# Patient Record
Sex: MALE | Race: WHITE | NOT HISPANIC OR LATINO | Employment: FULL TIME | URBAN - NONMETROPOLITAN AREA
[De-identification: names, ages, dates, MRNs, and addresses within clinical notes are randomized per-mention and may not be internally consistent; named-entity substitution may affect disease eponyms.]

---

## 2022-06-17 ENCOUNTER — TELEPHONE (OUTPATIENT)
Dept: OTOLARYNGOLOGY | Facility: CLINIC | Age: 42
End: 2022-06-17

## 2022-07-09 ENCOUNTER — TELEPHONE ENCOUNTER (OUTPATIENT)
Dept: URBAN - METROPOLITAN AREA CLINIC 121 | Facility: CLINIC | Age: 42
End: 2022-07-09

## 2022-07-10 ENCOUNTER — TELEPHONE ENCOUNTER (OUTPATIENT)
Dept: URBAN - METROPOLITAN AREA CLINIC 121 | Facility: CLINIC | Age: 42
End: 2022-07-10

## 2022-07-10 RX ORDER — CIPROFLOXACIN HCL 500 MG
TABLET ORAL TWICE A DAY
Refills: 0 | Status: ACTIVE | COMMUNITY
Start: 2006-05-24

## 2022-07-26 NOTE — PROGRESS NOTES
Donald Kee Jr, MD  [facility]NT [facility] EAR NOSE & THROAT  [address], [address]  [place] [address]  Fax [phone]  Phone [phone]      Visit Type: NEW PATIENT   Chief Complaint   Patient presents with   • Thyroid Problem     Thyroid nodules   • Adenopathy   • ear pressure, left ear        HPI   Accmpanied by: No one  He complains of Nodes and thyroid.   He had Covid in May. He then developed secondary infection. He underwent U/S and found to have thyroid nodules and some adenopathy.  Has similar infection 5 yrs ago.  Has had Rocephin injection and steroids.  Denies reflux symptoms.  He has sleep apnea and uses CPAP.  Smoke- none  Drink- none  Fever- at best low grade. Was seen early with symptoms.  He had dificulty swallowing. Some breathing difficutly. He has completely cleared.  He wears CPAP  Had thyroiditis 5 yrs ago, but normal TSH now      Past Medical History:   Diagnosis Date   • Bell's palsy    • Hypertension    • Sleep apnea        History reviewed. No pertinent surgical history.    Family History: His family history includes Diabetes type II in his father and maternal grandmother; Heart disease in his father.     Social History: He  reports that he has never smoked. He has never used smokeless tobacco. He reports that he does not drink alcohol and does not use drugs.    Home Medications:  Vitamin D3, fluticasone, hydroCHLOROthiazide, lisinopril, and oxymetazoline    Allergies:  He is allergic to keflex [cephalexin] and augmentin [amoxicillin-pot clavulanate].       Vital Signs:      ENT Physical Exam  Constitutional  Appearance: patient appears well-developed and well-nourished,  Communication/Voice: communication appropriate for developmental age; vocal quality normal;  Constitutional comments: obese  Head and Face  Appearance: head appears normal, face appears normal and face appears atraumatic;  Palpation: facial palpation  normal;  Salivary: glands normal;  Ear  Hearing: intact;  Auricles: bilateral auricles normal;  External Mastoids: right external mastoid normal; left external mastoid normal;  Ear Canals: bilateral ear canals normal;  Tympanic Membranes: bilateral tympanic membranes normal;  Nose  External Nose: nares patent bilaterally; external nose normal; nasal deformity not visible;  Internal Nose: bilateral intranasal mucosa edematous and erythematous; nasal septal deviation present; deviation is to the left, septal deviation is mild; bilateral inferior turbinates edematous and erythematous;  Oral Cavity/Oropharynx  Lips: normal;  Teeth: normal;  Gums: gingiva normal;  Tongue: tongue macroglossia present; Reese II position; Oral Tongue comments: Mod  Oral mucosa: normal;  Hard palate: normal;  Soft palate: normal;  Tonsils: normal;  Base of Tongue: normal;  Posterior pharyngeal wall: normal;  Neck  Neck: neck normal;  Respiratory  Inspection: breathing unlabored; normal breathing rate;  Cardiovascular  Inspection: extremities are warm and well perfused; no peripheral edema present;  Neurovestibular  Mental Status: alert and oriented;  Psychiatric: mood normal; affect is appropriate;     Flexible laryngoscopy    Date/Time: 7/28/2022 11:26 AM  Performed by: Donald Kee Jr., MD  Authorized by: Donald Kee Jr., MD     Consent:     Consent obtained:  Verbal    Consent given by:  Patient    Alternatives discussed:  No treatment  Anesthesia (see MAR for exact dosages):     Anesthesia method:  Topical application    Topical anesthetic:  Tetracaine  Procedure details:     Indications: hoarseness, dysphagia, or aspiration      Medication:  Afrin    Instrument: flexible fiberoptic laryngoscope      Scope location: left nare    Sinus/ Nasopharynx:     Right nasopharynx: patent and inflammation      Left nasopharynx: patent and inflammation      Right eustachian tube: patent      Left eustachian tube: inflammation,  patent and inflammation    Oropharynx/ Supraglottis:     Posterior pharyngeal wall: inflamed      Oropharynx: inflammation      Vallecula: inflammation      Base of tongue: lingual tonsillar hypertrophy and inflammation      Epiglottis: inflammation    Larynx/ Hypopharynx:     Arytenoids: inflammation and interarytenoid edema      Hypopharynx: inflammation      Pyriform sinus: inflammation      False vocal cords: inflammation      True vocal cords: normal    Post-procedure details:     Patient tolerance of procedure:  Tolerated well       Result Review    RESULTS REVIEW    I have reviewed the patients old records in the chart.   I have reviewed the patients old records in the chart.  The following results/records were reviewed:  I reviewed the patient's new imaging results and agree with the interpretation.   Referral to Otolaryngology for lymphadenopathy (06/20/2022)  REFERRAL/PRE-AUTH MRN - SCAN - Referral (06/15/2022)  SCANNED - IMAGING (07/28/2022 00:00)    Assessment & Plan    Diagnoses and all orders for this visit:    1. Pharyngitis, unspecified etiology (Primary)  Comments:  Active, probably related to reflux, requires further work-up  Orders:  -     Flexible laryngoscopy    2. ETD (Eustachian tube dysfunction), left  Comments:  Related to reflux and upper airway edema  Orders:  -     Flexible laryngoscopy    3. ANGIE on CPAP  Comments:  Compliant, pressures possibly causing irritation of the upper aerodigestive tract  Orders:  -     Flexible laryngoscopy    4. Obesities, morbid (HCC)    5. Nasal turbinate hypertrophy  Comments:  Bilateral ITs    6. Cervical adenopathy  Comments:  resolved  Orders:  -     Flexible laryngoscopy    7. Anomaly of epiglottis  Comments:  Check magda long L  Orders:  -     Flexible laryngoscopy    8. Multiple thyroid nodules  Comments:  Small, requires no immediate work-up    Other orders  -     fluticasone (FLONASE) 50 MCG/ACT nasal spray; 2 sprays into the nostril(s) as directed by  provider 2 (Two) Times a Day.  Dispense: 48 g; Refill: 3  -     oxymetazoline (AFRIN) 0.05 % nasal spray; 2 sprays into the nostril(s) as directed by provider 3 (Three) Times a Day for 3 days. Use for 3 days then stop  Dispense: 2 mL; Refill: 0       Conservative management.     Patient has multiple ENT issues.  I feel his pharyngitis is caused from CPAP and reflux.  He is also had recent upper respiratory tract infection.  This may have left him with residual edema, exacerbated by CPAP especially along the eustachian tubes.  I do not feel the patient has any adenopathy at this point time.  He probably had acute reactive adenitis that has resolved.  He does have thyroid nodules that do not require immediate work-up at this point in time.  I see no evidence of thyroiditis.  I do not seen thyroiditis. I will treat ETD with nasal steroids. He will continue CPAP.  Afrin x 3 days  Flonase BID  Wear CPAP  Nasal saline  See PCP for yearly thyroid evaluation  Call for problems      My Chart:  Patient is using My Chart    Patient understand(s) and agree(s) with the treatment plan as described.    Return in about 6 weeks (around 9/8/2022) for Recheck Nose, ETD.      Donald Kee Jr, MD  07/28/22  11:24 CDT

## 2022-07-28 ENCOUNTER — OFFICE VISIT (OUTPATIENT)
Dept: OTOLARYNGOLOGY | Facility: CLINIC | Age: 42
End: 2022-07-28

## 2022-07-28 VITALS — TEMPERATURE: 98.2 F | WEIGHT: 306 LBS

## 2022-07-28 DIAGNOSIS — G47.33 OSA ON CPAP: ICD-10-CM

## 2022-07-28 DIAGNOSIS — Q31.8 ANOMALY OF EPIGLOTTIS: ICD-10-CM

## 2022-07-28 DIAGNOSIS — J02.9 PHARYNGITIS, UNSPECIFIED ETIOLOGY: Primary | ICD-10-CM

## 2022-07-28 DIAGNOSIS — H69.82 ETD (EUSTACHIAN TUBE DYSFUNCTION), LEFT: ICD-10-CM

## 2022-07-28 DIAGNOSIS — R59.0 CERVICAL ADENOPATHY: ICD-10-CM

## 2022-07-28 DIAGNOSIS — E66.01 OBESITIES, MORBID: ICD-10-CM

## 2022-07-28 DIAGNOSIS — Z99.89 OSA ON CPAP: ICD-10-CM

## 2022-07-28 DIAGNOSIS — E04.2 MULTIPLE THYROID NODULES: ICD-10-CM

## 2022-07-28 DIAGNOSIS — J34.3 NASAL TURBINATE HYPERTROPHY: ICD-10-CM

## 2022-07-28 PROCEDURE — 99204 OFFICE O/P NEW MOD 45 MIN: CPT | Performed by: OTOLARYNGOLOGY

## 2022-07-28 PROCEDURE — 31575 DIAGNOSTIC LARYNGOSCOPY: CPT | Performed by: OTOLARYNGOLOGY

## 2022-07-28 RX ORDER — HYDROCHLOROTHIAZIDE 12.5 MG/1
12.5 TABLET ORAL EVERY MORNING
COMMUNITY
Start: 2022-07-06

## 2022-07-28 RX ORDER — LISINOPRIL 40 MG/1
TABLET ORAL
COMMUNITY
Start: 2020-03-26

## 2022-07-28 RX ORDER — FLUTICASONE PROPIONATE 50 MCG
2 SPRAY, SUSPENSION (ML) NASAL 2 TIMES DAILY
Qty: 48 G | Refills: 3 | Status: SHIPPED | OUTPATIENT
Start: 2022-07-28

## 2022-07-28 RX ORDER — CHOLECALCIFEROL (VITAMIN D3) 1250 MCG
CAPSULE ORAL
COMMUNITY
Start: 2020-03-26

## 2022-07-28 RX ORDER — OXYMETAZOLINE HYDROCHLORIDE 0.05 G/100ML
2 SPRAY NASAL 3 TIMES DAILY
Qty: 2 ML | Refills: 0 | Status: SHIPPED | OUTPATIENT
Start: 2022-07-28 | End: 2022-07-31

## 2022-07-28 NOTE — PATIENT INSTRUCTIONS
Afrin use:  Use 2 puffs each nostril 3 times daily for 3 days only!!  Stop using after 3 days unless instructed to use longer    Nasal steroid use:  Using nasal steroids:  You will be prescribed one of the following nasal steroids: Flonase, Nasacort, Nasonex, Rhinocort, Qnasl, Zetonna  2 puffs each nostril 2 times daily  Start as soon as possible    Use Afrin first and wait 10 minutes to allow the nose to open. Then administer nasal steroids.       NASAL SALINE:  Use 2 puffs each nostril 4-6 times daily and more frequently if possible.  You can buy saline spray or you can make your own and use an old spray bottle to administer  Use a humidifier at bedside  Recipe for saline:  Water                                 1 quart  Salt (table)                        1 tablespoon  Gylcerin (or Maddy Syrup)    1 teaspoon  Sodium bicarbonate           1 teaspoon  Sprays or Seema pots are recommended    Do not allow to stand for more than 24 hrs. Make new solution. There is no preservative in this solution.     How to pop ears:  Pop your ears by holding nose and closing mouth, then blow hard until ears pop  Children (less than 8-9 years)- blow up ballons 4 times a day and more frequently     Wear CPAP    Diet  Exercise        CONTACT INFORMATION:  The main office phone number is [phone]. For emergencies after hours and on weekends, this number will convert over to our answering service and the on call provider will answer. Please try to keep non emergent phone calls/ questions to office hours 9am-5pm Monday through Friday.     [employer]  As an alternative, you can sign up and use the Epic MyChart system for more direct and quicker access for non emergent questions/ problems.  [employer] allows you to send messages to your doctor, view your test results, renew your prescriptions, schedule appointments, and more. To sign up, go to [employer] and click on the Sign Up Now link in the New User? box.  Enter your [employer] Activation Code exactly as it appears below along with the last four digits of your Social Security Number and your Date of Birth () to complete the sign-up process. If you do not sign up before the expiration date, you must request a new code.    [employer] Activation Code: Activation code not generated  Current [employer] Status: Active    If you have questions, you can email [email or web address] or call [phone] to talk to our [employer] staff. Remember, [employer] is NOT to be used for urgent needs. For medical emergencies, dial 911.

## 2022-09-14 ENCOUNTER — OFFICE VISIT (OUTPATIENT)
Dept: OTOLARYNGOLOGY | Facility: CLINIC | Age: 42
End: 2022-09-14

## 2022-09-14 VITALS
TEMPERATURE: 98.4 F | HEIGHT: 70 IN | SYSTOLIC BLOOD PRESSURE: 141 MMHG | DIASTOLIC BLOOD PRESSURE: 80 MMHG | BODY MASS INDEX: 42.95 KG/M2 | WEIGHT: 300 LBS | HEART RATE: 111 BPM

## 2022-09-14 DIAGNOSIS — H69.82 ETD (EUSTACHIAN TUBE DYSFUNCTION), LEFT: ICD-10-CM

## 2022-09-14 DIAGNOSIS — K21.9 GASTROESOPHAGEAL REFLUX DISEASE WITHOUT ESOPHAGITIS: ICD-10-CM

## 2022-09-14 DIAGNOSIS — J34.3 NASAL TURBINATE HYPERTROPHY: ICD-10-CM

## 2022-09-14 DIAGNOSIS — R59.0 CERVICAL ADENOPATHY: ICD-10-CM

## 2022-09-14 DIAGNOSIS — E04.2 MULTIPLE THYROID NODULES: ICD-10-CM

## 2022-09-14 DIAGNOSIS — Q31.8 ANOMALY OF EPIGLOTTIS: ICD-10-CM

## 2022-09-14 DIAGNOSIS — Z99.89 OSA ON CPAP: ICD-10-CM

## 2022-09-14 DIAGNOSIS — E66.01 OBESITIES, MORBID: ICD-10-CM

## 2022-09-14 DIAGNOSIS — G47.33 OSA ON CPAP: ICD-10-CM

## 2022-09-14 DIAGNOSIS — J02.9 PHARYNGITIS, UNSPECIFIED ETIOLOGY: Primary | ICD-10-CM

## 2022-09-14 DIAGNOSIS — K21.9 LARYNGOPHARYNGEAL REFLUX (LPR): ICD-10-CM

## 2022-09-14 PROCEDURE — 99213 OFFICE O/P EST LOW 20 MIN: CPT | Performed by: OTOLARYNGOLOGY

## 2022-09-14 PROCEDURE — 31575 DIAGNOSTIC LARYNGOSCOPY: CPT | Performed by: OTOLARYNGOLOGY

## 2022-09-14 NOTE — PROGRESS NOTES
Donald Kee Jr, MD  [facility] EAR NOSE & THROAT  [address], [address]  [place] [address]  Fax [phone]  Phone [phone]      Visit Type: FOLLOW UP   Chief Complaint   Patient presents with   • Follow-up     Pharynitis        HPI   Accompanied by: No one  He presents for a follow up evaluation. He has stopped nasal steroids.  He feels better.  Using CPAP.  Diet- not really  Exercising- none  Throat is better.  Sometimes has L ear pressure. Will pop and resolves.     Past Medical History:   Diagnosis Date   • Bell's palsy    • Hypertension    • Sleep apnea        History reviewed. No pertinent surgical history.    Family History: His family history includes Diabetes type II in his father and maternal grandmother; Heart disease in his father.     Social History: He  reports that he has never smoked. He has never used smokeless tobacco. He reports that he does not drink alcohol and does not use drugs.    Home Medications:  Vitamin D3, fluticasone, hydroCHLOROthiazide, and lisinopril    Allergies:  He is allergic to keflex [cephalexin] and augmentin [amoxicillin-pot clavulanate].       Vital Signs:   Temp:  [98.4 °F (36.9 °C)] 98.4 °F (36.9 °C)  Heart Rate:  [111] 111  BP: (141)/(80) 141/80  ENT Physical Exam  Constitutional  Appearance: patient appears well-developed and well-nourished,  Communication/Voice: communication appropriate for developmental age; vocal quality normal;  Constitutional comments: obese  Head and Face  Appearance: head appears normal, face appears normal and face appears atraumatic;  Palpation: facial palpation normal;  Salivary: glands normal;  Ear  Hearing: intact;  Auricles: bilateral auricles normal;  External Mastoids: right external mastoid normal; left external mastoid normal;  Ear Canals: bilateral ear canals normal;  Tympanic Membranes: bilateral tympanic membranes normal;  Nose  External Nose: nares patent  bilaterally; external nose normal; nasal deformity not visible;  Internal Nose: bilateral intranasal mucosa edematous and erythematous; nasal septal deviation present; deviation is to the left, septal deviation is mild; Septum comments: Left to right low and mid moderate to severe   bilateral inferior turbinates edematous and erythematous;  Oral Cavity/Oropharynx  Lips: normal;  Teeth: normal;  Gums: gingiva normal;  Tongue: tongue macroglossia present; Reese II position; Oral Tongue comments: Mod  Oral mucosa: normal;  Hard palate: normal;  Soft palate: normal;  Tonsils: normal;  Base of Tongue: normal;  Posterior pharyngeal wall: normal;  Neck  Neck: neck normal;  Respiratory  Inspection: breathing unlabored; normal breathing rate;  Cardiovascular  Inspection: extremities are warm and well perfused; no peripheral edema present;  Neurovestibular  Mental Status: alert and oriented;  Psychiatric: mood normal; affect is appropriate;     Flexible laryngoscopy    Date/Time: 9/14/2022 3:59 PM  Performed by: Donald Kee Jr., MD  Authorized by: Donald Kee Jr., MD     Consent:     Consent obtained:  Verbal    Consent given by:  Patient    Alternatives discussed:  No treatment  Anesthesia (see MAR for exact dosages):     Anesthesia method:  Topical application  Procedure details:     Indications: airway obstruction and hoarseness, dysphagia, or aspiration      Medication:  Afrin    Instrument: flexible fiberoptic laryngoscope      Scope location: right nare    Nasal cavity:     Nasal vestibule: left nasal cavity occluded    Sinus/ Nasopharynx:     Right nasopharynx: patent and inflammation      Left nasopharynx: patent and inflammation      Right eustachian tube: patent      Left eustachian tube: inflammation, patent and inflammation    Oropharynx/ Supraglottis:     Posterior pharyngeal wall: inflamed      Oropharynx: inflammation      Base of tongue: lingual tonsillar hypertrophy and inflammation       Epiglottis: retroflexed (Partially overshadowing the supraglottis)    Larynx/ Hypopharynx:     Arytenoids: inflammation      Hypopharynx: inflammation      Pyriform sinus: inflammation      False vocal cords: inflammation      True vocal cords: normal    Post-procedure details:     Patient tolerance of procedure:  Tolerated well  Comments:      Overall appearance the mucosal surface is that of inflammation from reflux  Epiglottis is retroflexed significantly enough to partially obstruct the supraglottic aperture       Result Review    RESULTS REVIEW    I have reviewed the patients old records in the chart.   I have reviewed the patients old records in the chart.     Assessment & Plan    Diagnoses and all orders for this visit:    1. Pharyngitis, unspecified etiology (Primary)  Comments:  Markedly improved  Orders:  -     Flexible laryngoscopy    2. ETD (Eustachian tube dysfunction), left  Comments:  Improved    3. ANGIE on CPAP  Comments:  Controlled  Orders:  -     Flexible laryngoscopy    4. Obesities, morbid (HCC)  Comments:  Encourage lifestyle changes    5. Nasal turbinate hypertrophy  Comments:  Improved    6. Cervical adenopathy    7. Anomaly of epiglottis  Comments:  Retroflexed contributing to hypopharyngeal obstruction  Orders:  -     Flexible laryngoscopy    8. Laryngopharyngeal reflux (LPR)  Comments:  Improved control  Orders:  -     Flexible laryngoscopy    9. Gastroesophageal reflux disease without esophagitis  Comments:  Improved    10. Multiple thyroid nodules  Comments:  Requires further follow-up, recommend ultrasound in 1 year  Orders:  -     US Head Neck Soft Tissue; Future       Continue current management plan.     Patient appears to have controlled most of his throat symptoms.  I still feel he has significant sleep apnea induced gastroesophageal reflux.  He has the appearance of reflux in his throat.  I have advised the patient that change of lifestyle, diet, exercise, reflux control are  paramount importance.  He will continue control of sleep apnea.  I have discussed sleep apnea surgery with him as well.  Patient has history of thyroid nodules.  I feel he needs thyroid ultrasound in approximately 1 year.  I will follow him up in 1 year with a thyroid ultrasound.  I can continue discussion of above problems with him at that point time  Reflux precautions  Antacids  Nasal saline  Call fro nose and throat problems      My Chart:  Patient is using My Chart    Patient understand(s) and agree(s) with the treatment plan as described.    Return in about 1 year (around 9/14/2023) for Recheck Nose, ETD and throat. Thyroid U/S.      Donald Kee Jr, MD  09/14/22  16:02 CDT

## 2022-09-14 NOTE — PATIENT INSTRUCTIONS
"NASAL SALINE:  Use 2 puffs each nostril 4-6 times daily and more frequently if possible.  You can buy saline spray or you can make your own and use an old spray bottle to administer  Use a humidifier at bedside  Recipe for saline:  Water                                 1 quart  Salt (table)                        1 tablespoon  Gylcerin (or Maddy Syrup)    1 teaspoon  Sodium bicarbonate           1 teaspoon  Sprays or Seema pots are recommended    Do not allow to stand for more than 24 hrs. Make new solution. There is no preservative in this solution.     THE PROBLEM OF ACID REFLUX    In BOTH children and adults, irritating acids may leak from the stomach and come up into the esophagus and throat. This can occur at any time, but occurs more commonly when lying down. When the acid touches the lining of the esophagus, there is irritation and muscle spasm, which can be felt up into the throat. Usually this is felt as \"heartburn\". When scarring of the esophagus occurs, the esophagus becomes less sensitive and the symptoms may only be in the throat.     Some of the symptoms that can occur with acid reflux into the throat include coughing, soreness, burning, hoarseness, throat clearing, excessive mucous, bad taste in the mouth, bad breath, a sensation of a lump in the throat, wheezing, post-nasal drip, choking spells, bleeding and nausea. In a small percentage of people, more serious problems may result, such as pneumonia, ulcers in the larynx (voice box), reduced mobility of the vocal cords and a pouch in the upper esophagus (diverticulum). In children, the symptoms may be different and include persistent vomiting, bleeding from the esophagus, respiratory problems, pneumonia, asthma, choking spells, swallowing problems and anemia. In some cases, unexplained fussiness and crying is due to reflux.     The following instructions are designed to help neutralize the stomach acid, reduce the production of the acid, promote " emptying of the acid from the stomach into the intestines and prevent acid from coming up into the esophagus. You should adopt enough of these changes to alleviate your symptoms. If carrying out these suggestions produces no change, it is imperative that you return so that we can discuss further the problem. More testing may be required, as might medication. It is important to realize that the esophagus takes time to heal, so you should not expect results immediately.    Diet  Most often, the throat symptoms above are due to dietary abuses. Certain foods are known to cause irritation, because they are acids themselves or cause excess production of stomach acid. These should be avoided, if possible. The following is a partial list of foods recognized as troublesome: coffee (even decaffeinated), tea chocolate, cola beverages, citrus beverages (such as 7-Up), caffeine containing beverages, alcohol, citrus fruits and juices, highly spiced foods, fatty foods, candies, nuts, mints, milk and milk products. Some of the worst offenders for promoting stomach acid are milk and beer.     Hard candies, gum, breath fresheners, throat lozenges, cough drops, mouthwashes, gargles, may actually irritate the throat directly (many cough drops and lozenges contain irritants such as oil of eucalyptus and menthol), and can also stimulate acid production directly.     Large amounts of liquid in the diet tend to promote reflux, because liquids tend to come back up more easily than solids.     Meals should be bland and consist of real food, trying to avoid recreational food. Multiple small meals, rather than one or two large meals helps prevent reflux by not stretching the stomach and increasing the time needed for digestion, as well increasing the amount of acid needed to digest the meal. If you are overweight, losing weight is tremendously helpful.     YOU SHOULD NOT EAT FOR AT LEAST TWO HOURS BEFORE RETIRING AND YOU SHOULD REMAIN SITTING  OR STANDING FOR AT LEAST 45 MINUTES AFTER EACH MEAL. This promotes passage of food from the stomach into the intestine.    Posture  Body weight is a significant factor in promoting reflux. Losing weight is beneficial. Pregnancy will markedly increase the symptoms of heartburn and throat pain. You should avoid clothing that fits tightly across the mid-section, as this promotes squeezing of the abdominal contents upward. It is helpful to practice abdominal or diaphragmatic breathing, using the stomach to push out each breath rather than chest expansion. Avoid slumping while sitting, and avoid stooping or straining.     For many, the reflux occurs at night while sleeping. This is the time acid production is the greatest and you are lying down, a position that promotes reflux, thus setting up the irritation that occurs the next morning. One of the most important things you can do is to elevate the head of the bed, in an effort to use gravity to keep the acid in the stomach. This is accomplished by placed blocks or bricks beneath the legs at the head of the bed, raising the head 6-10 inches. Do not make the head so high that you slide down. Pillows are not effective because they cause the body to curl, increasing abdominal pressure and it is difficult to remain upright on them. Additionally, pillows promote sleeping on the back, but it is far more desirable to sleep on the right side or on the stomach, as this allows gas to escape, while preventing the escape of acid material. Children and infants, who are refluxing, should sleep on their stomachs.  Exercise  Regular exercise is extremely beneficial in promoting good digestion and regularity. The abdominal muscles are toned, which aids in controlling acid problems. However, it is recommended that you not participate in vigorous activity immediately after eating. This causes pressure on an already full stomach, forcing acid up into the esophagus. Regular exercise is  encouraged, prior to meals, or two hours after meals.  Antacids  Antacids should be used regularly, as they neutralize excess acid. Gelusil II, Mylanta II, Tums and Rolaids are popular brands. Gaviscon is not an antacid, but floats on top of the stomach acid, preventing esophageal irritation. Care should be used in administering large doses of antacids, especially in children. Many antacid preparations contain magnesium, which promotes frequent bowel movements, while antacids that contain aluminum can be constipating. Tums have a high calcium content that can be used to some advantage in older women with reflux.     Antacid tablets are convenient, but the liquid form tends to work much more quickly. Also remember to check with your physician about interference with other medications. Antacids can slow the absorption of digitalis, Indocin, tetracyclines, fluorides and isoniazid from the intestines. Many medications require the presence of stomach acid to be absorbed.     Initially, antacids should be used 30 minutes after each meal, 2 hours after each meal and at bedtime. This maximizes the neutralization of the stomach acid. Antacids can be used more frequently if symptoms persist, but such extensive use needs to be reviewed with your physician.  Prescription Medications  If the above recommendations are not effectively resolving the symptoms, medications may be prescribed. These are usually in the form of acid production blockers, such as Tagamet, Zantac, Pepcid, or Axid or acid pump inhibitors like Prevacid, Nexium, Protonix or Prilosec. These drugs help stop acid production in the stomach. Medications such as Reglan can be used to promote stomach emptying.  Medications That Promote Reflux  Certain medications can promote acid reflux; either by relaxing the sphincter muscle that helps keeps stomach acid down, or increasing the acid production. These include progesterone (Provera, Ortho Novum, Ovral, other birth  control pills), theophylline (Ismael-Dur, etc.), anticholinergic (Donnatal, Scopolamine, Probanthine, Bentil, others), beta blockers (Inderal, Tenormin and Corgard), alpha blockers (Dibenzyline), dopamine, calcium channel blockers (Procardia, Cardizem, Isotin, Calan), aspirin, non-steroidal anti-inflammatory drugs (Motrin, Advil, Nuprin, Nalfon, Rufen, Indocin, Feldene, Clinoril and Tolectin). Vitamin C is an acid and can promote reflux. This is only a partial list and before stopping any prescription medication, you should check with your physician.    Goal  The goal is to reduce the symptoms with the least number of lifestyle changes. A combination of the above suggestions is frequently prescribed to return you to normal as quickly as possible. However, this problem did not develop overnight and will not disappear rapidly. Therefore, developing a regimen will aid in controlling symptoms and keep you symptom free for a long period of time. Other alternatives exist, should these suggestions fail, and can be discussed with your physician.     To Summarize:  Watch your diet, avoid foods that cause acid reflux  Lose weight  Avoid tight fitting clothes  Adjust your posture, raise the head of the bed  Exercise regularly  Use antacids  Use prescription medication as directed  Avoid medications that promote reflux  Avoid behavior and eating habits that promote reflux of stomach acid     You are welcome to do a Google search on the topic of reflux and reflux diets. The internet has many useful resources.     Please remember, your success in controlling this condition depends on many factors including diet, exercise, medications and follow up. All are important and must be utilized to achieve success.       ANTACID USE:  Use antacids 30 mins after every meal, 2 hours after every meal and at Bedtime  Use Gaviscon Extra (best), Tums, Rolaids, etc  Over the counter  Use 2 tsp or 2 tabs as the dose  Remember that some of these  products contain Calcium or Aluminum. If you have dietary or medical issues, please inform physician     Thyroid Ultrasound 1 yr    CONTACT INFORMATION:  The main office phone number is [phone]. For emergencies after hours and on weekends, this number will convert over to our answering service and the on call provider will answer. Please try to keep non emergent phone calls/ questions to office hours 9am-5pm Monday through Friday.     [facility]  As an alternative, you can sign up and use the Epic MyChart system for more direct and quicker access for non emergent questions/ problems.  [employer] allows you to send messages to your doctor, view your test results, renew your prescriptions, schedule appointments, and more. To sign up, go to [employer] and click on the Sign Up Now link in the New User? box. Enter your [facility] Activation Code exactly as it appears below along with the last four digits of your Social Security Number and your Date of Birth () to complete the sign-up process. If you do not sign up before the expiration date, you must request a new code.    [facility] Activation Code: Activation code not generated  Current [facility] Status: Active    If you have questions, you can email [email or web address] or call [phone] to talk to our [facility] staff. Remember, [facility] is NOT to be used for urgent needs. For medical emergencies, dial 911.

## 2023-03-20 ENCOUNTER — APPOINTMENT (OUTPATIENT)
Dept: CT IMAGING | Facility: HOSPITAL | Age: 43
End: 2023-03-20
Payer: COMMERCIAL

## 2023-03-20 ENCOUNTER — HOSPITAL ENCOUNTER (EMERGENCY)
Facility: HOSPITAL | Age: 43
Discharge: HOME | End: 2023-03-20
Attending: EMERGENCY MEDICINE | Admitting: EMERGENCY MEDICINE
Payer: COMMERCIAL

## 2023-03-20 VITALS
OXYGEN SATURATION: 95 % | RESPIRATION RATE: 16 BRPM | TEMPERATURE: 98.1 F | WEIGHT: 300 LBS | HEART RATE: 89 BPM | SYSTOLIC BLOOD PRESSURE: 150 MMHG | DIASTOLIC BLOOD PRESSURE: 99 MMHG | HEIGHT: 70 IN | BODY MASS INDEX: 42.95 KG/M2

## 2023-03-20 DIAGNOSIS — K86.2 PANCREATIC CYST: ICD-10-CM

## 2023-03-20 DIAGNOSIS — N20.0 KIDNEY STONE ON LEFT SIDE: Primary | ICD-10-CM

## 2023-03-20 LAB
ALBUMIN SERPL-MCNC: 4.7 G/DL (ref 3.5–5.2)
ALBUMIN/GLOB SERPL: 1.4 G/DL
ALP SERPL-CCNC: 100 U/L (ref 39–117)
ALT SERPL W P-5'-P-CCNC: 26 U/L (ref 1–41)
ANION GAP SERPL CALCULATED.3IONS-SCNC: 14 MMOL/L (ref 5–15)
AST SERPL-CCNC: 27 U/L (ref 1–40)
BACTERIA UR QL AUTO: ABNORMAL /HPF
BASOPHILS # BLD AUTO: 0.11 10*3/MM3 (ref 0–0.2)
BASOPHILS NFR BLD AUTO: 0.6 % (ref 0–1.5)
BILIRUB SERPL-MCNC: 0.4 MG/DL (ref 0–1.2)
BILIRUB UR QL STRIP: NEGATIVE
BUN SERPL-MCNC: 14 MG/DL (ref 6–20)
BUN/CREAT SERPL: 12.6 (ref 7–25)
CALCIUM SPEC-SCNC: 9.6 MG/DL (ref 8.6–10.5)
CHLORIDE SERPL-SCNC: 102 MMOL/L (ref 98–107)
CLARITY UR: CLEAR
CO2 SERPL-SCNC: 24 MMOL/L (ref 22–29)
COLOR UR: YELLOW
CREAT SERPL-MCNC: 1.11 MG/DL (ref 0.76–1.27)
DEPRECATED RDW RBC AUTO: 38.2 FL (ref 37–54)
EGFRCR SERPLBLD CKD-EPI 2021: 85 ML/MIN/1.73
EOSINOPHIL # BLD AUTO: 0.13 10*3/MM3 (ref 0–0.4)
EOSINOPHIL NFR BLD AUTO: 0.7 % (ref 0.3–6.2)
ERYTHROCYTE [DISTWIDTH] IN BLOOD BY AUTOMATED COUNT: 13.2 % (ref 12.3–15.4)
GLOBULIN UR ELPH-MCNC: 3.3 GM/DL
GLUCOSE SERPL-MCNC: 138 MG/DL (ref 65–99)
GLUCOSE UR STRIP-MCNC: NEGATIVE MG/DL
HCT VFR BLD AUTO: 44.9 % (ref 37.5–51)
HGB BLD-MCNC: 14.7 G/DL (ref 13–17.7)
HGB UR QL STRIP.AUTO: ABNORMAL
HYALINE CASTS UR QL AUTO: ABNORMAL /LPF
IMM GRANULOCYTES # BLD AUTO: 0.06 10*3/MM3 (ref 0–0.05)
IMM GRANULOCYTES NFR BLD AUTO: 0.3 % (ref 0–0.5)
KETONES UR QL STRIP: ABNORMAL
LEUKOCYTE ESTERASE UR QL STRIP.AUTO: ABNORMAL
LYMPHOCYTES # BLD AUTO: 1.35 10*3/MM3 (ref 0.7–3.1)
LYMPHOCYTES NFR BLD AUTO: 7.1 % (ref 19.6–45.3)
MCH RBC QN AUTO: 26.3 PG (ref 26.6–33)
MCHC RBC AUTO-ENTMCNC: 32.7 G/DL (ref 31.5–35.7)
MCV RBC AUTO: 80.2 FL (ref 79–97)
MONOCYTES # BLD AUTO: 0.8 10*3/MM3 (ref 0.1–0.9)
MONOCYTES NFR BLD AUTO: 4.2 % (ref 5–12)
NEUTROPHILS NFR BLD AUTO: 16.49 10*3/MM3 (ref 1.7–7)
NEUTROPHILS NFR BLD AUTO: 87.1 % (ref 42.7–76)
NITRITE UR QL STRIP: NEGATIVE
NRBC BLD AUTO-RTO: 0 /100 WBC (ref 0–0.2)
PH UR STRIP.AUTO: 7.5 [PH] (ref 5–8)
PLATELET # BLD AUTO: 324 10*3/MM3 (ref 140–450)
PMV BLD AUTO: 9.9 FL (ref 6–12)
POTASSIUM SERPL-SCNC: 3.7 MMOL/L (ref 3.5–5.2)
PROT SERPL-MCNC: 8 G/DL (ref 6–8.5)
PROT UR QL STRIP: ABNORMAL
RBC # BLD AUTO: 5.6 10*6/MM3 (ref 4.14–5.8)
RBC # UR STRIP: ABNORMAL /HPF
REF LAB TEST METHOD: ABNORMAL
SODIUM SERPL-SCNC: 140 MMOL/L (ref 136–145)
SP GR UR STRIP: 1.02 (ref 1–1.03)
SQUAMOUS #/AREA URNS HPF: ABNORMAL /HPF
UROBILINOGEN UR QL STRIP: ABNORMAL
WBC # UR STRIP: ABNORMAL /HPF
WBC NRBC COR # BLD: 18.94 10*3/MM3 (ref 3.4–10.8)

## 2023-03-20 PROCEDURE — 74176 CT ABD & PELVIS W/O CONTRAST: CPT

## 2023-03-20 PROCEDURE — 81001 URINALYSIS AUTO W/SCOPE: CPT | Performed by: EMERGENCY MEDICINE

## 2023-03-20 PROCEDURE — 85025 COMPLETE CBC W/AUTO DIFF WBC: CPT | Performed by: EMERGENCY MEDICINE

## 2023-03-20 PROCEDURE — 80053 COMPREHEN METABOLIC PANEL: CPT | Performed by: EMERGENCY MEDICINE

## 2023-03-20 PROCEDURE — 99283 EMERGENCY DEPT VISIT LOW MDM: CPT

## 2023-03-20 RX ORDER — SODIUM CHLORIDE 0.9 % (FLUSH) 0.9 %
10 SYRINGE (ML) INJECTION AS NEEDED
Status: DISCONTINUED | OUTPATIENT
Start: 2023-03-20 | End: 2023-03-21 | Stop reason: HOSPADM

## 2023-03-20 RX ORDER — KETOROLAC TROMETHAMINE 10 MG/1
10 TABLET, FILM COATED ORAL EVERY 6 HOURS PRN
Qty: 15 TABLET | Refills: 0 | Status: SHIPPED | OUTPATIENT
Start: 2023-03-20

## 2023-03-20 RX ORDER — HYDROCODONE BITARTRATE AND ACETAMINOPHEN 7.5; 325 MG/1; MG/1
1 TABLET ORAL EVERY 4 HOURS PRN
Qty: 10 TABLET | Refills: 0 | Status: SHIPPED | OUTPATIENT
Start: 2023-03-20

## 2023-03-20 RX ORDER — ONDANSETRON 4 MG/1
4 TABLET, ORALLY DISINTEGRATING ORAL EVERY 4 HOURS PRN
Qty: 10 TABLET | Refills: 0 | Status: SHIPPED | OUTPATIENT
Start: 2023-03-20

## 2023-03-20 RX ORDER — TAMSULOSIN HYDROCHLORIDE 0.4 MG/1
1 CAPSULE ORAL DAILY
Qty: 30 CAPSULE | Refills: 0 | Status: SHIPPED | OUTPATIENT
Start: 2023-03-20 | End: 2023-03-23 | Stop reason: SDUPTHER

## 2023-03-21 NOTE — ED PROVIDER NOTES
"Subjective   History of Present Illness  Patient says he felt \"gassy and bloated\" yesterday.  He took some different medications for that.  However today he still had some more discomfort in his left flank.  He said the pain suddenly got worse this afternoon.  He was in his left flank and he had some nausea with it.  He took some Motrin and it is somewhat better now but he called the doctors on-call at his services and they told to come in to get checked out for kidney stone.  He does not run the last time he urinated.  He thinks he urinated more frequently yesterday but less today.  He denies any fever or chills.    History provided by:  Patient   used: No    Flank Pain  Pain location:  L flank  Pain quality: aching    Pain radiates to:  Does not radiate  Pain severity:  Moderate  Onset quality:  Gradual  Duration:  1 day  Timing:  Constant  Progression:  Unchanged  Chronicity:  New  Context: not alcohol use, not awakening from sleep, not diet changes, not laxative use, not medication withdrawal, not recent sexual activity, not recent travel, not retching, not sick contacts and not suspicious food intake    Relieved by:  Nothing  Worsened by:  Nothing  Ineffective treatments:  None tried  Associated symptoms: nausea    Associated symptoms: no belching, no chest pain, no constipation, no dysuria, no fatigue, no fever, no hematemesis, no hematochezia, no shortness of breath, no vaginal bleeding and no vaginal discharge    Risk factors: obesity    Risk factors: no alcohol abuse, no aspirin use, not elderly, has not had multiple surgeries and not pregnant        Review of Systems   Constitutional: Negative.  Negative for fatigue and fever.   HENT: Negative.    Respiratory: Negative.  Negative for shortness of breath.    Cardiovascular: Negative.  Negative for chest pain.   Gastrointestinal: Positive for nausea. Negative for constipation, hematemesis and hematochezia.   Genitourinary: Positive for " flank pain. Negative for dysuria, vaginal bleeding and vaginal discharge.   Skin: Negative.    Neurological: Negative.    Psychiatric/Behavioral: Negative.    All other systems reviewed and are negative.      Past Medical History:   Diagnosis Date   • Bell's palsy    • Hypertension    • Sleep apnea        Allergies   Allergen Reactions   • Keflex [Cephalexin] Hives   • Augmentin [Amoxicillin-Pot Clavulanate] Rash       History reviewed. No pertinent surgical history.    Family History   Problem Relation Age of Onset   • Diabetes type II Father    • Heart disease Father    • Diabetes type II Maternal Grandmother        Social History     Socioeconomic History   • Marital status:    Tobacco Use   • Smoking status: Never   • Smokeless tobacco: Never   Vaping Use   • Vaping Use: Never used   Substance and Sexual Activity   • Alcohol use: Never   • Drug use: Never       Prior to Admission medications    Medication Sig Start Date End Date Taking? Authorizing Provider   Cholecalciferol (Vitamin D3) 1.25 MG (92304 UT) capsule  3/26/20   Nona Warren MD   fluticasone (FLONASE) 50 MCG/ACT nasal spray 2 sprays into the nostril(s) as directed by provider 2 (Two) Times a Day. 7/28/22   Donald Kee Jr., MD   hydroCHLOROthiazide (HYDRODIURIL) 12.5 MG tablet Take 12.5 mg by mouth Every Morning. 7/6/22   Nona Warren MD   lisinopril (PRINIVIL,ZESTRIL) 40 MG tablet  3/26/20   Nona Warren MD       Medications   sodium chloride 0.9 % flush 10 mL (has no administration in time range)       Vitals:    03/20/23 2224   BP: 150/99   Pulse: 89   Resp: 16   Temp: 98.1 °F (36.7 °C)   SpO2: 95%         Objective   Physical Exam  Vitals and nursing note reviewed.   Constitutional:       Appearance: Normal appearance.   HENT:      Head: Normocephalic and atraumatic.   Cardiovascular:      Rate and Rhythm: Normal rate and regular rhythm.   Pulmonary:      Effort: Pulmonary effort is normal.      Breath  sounds: Normal breath sounds.   Abdominal:      General: Bowel sounds are normal.      Palpations: Abdomen is soft.      Tenderness: There is no abdominal tenderness.   Musculoskeletal:         General: Tenderness present.      Comments: Patient has some tenderness palpation in the left flank area but no obvious deformities noted.   Neurological:      General: No focal deficit present.      Mental Status: He is alert and oriented to person, place, and time.   Psychiatric:         Mood and Affect: Mood normal.         Behavior: Behavior normal.         Procedures         Lab Results (last 24 hours)     Procedure Component Value Units Date/Time    CBC & Differential [[id number]]  (Abnormal) Collected: 03/20/23 2118    Specimen: Blood Updated: 03/20/23 2140    Narrative:      The following orders were created for panel order CBC & Differential.  Procedure                               Abnormality         Status                     ---------                               -----------         ------                     CBC Auto Differential[[id number]]        Abnormal            Final result                 Please view results for these tests on the individual orders.    Comprehensive Metabolic Panel [[id number]]  (Abnormal) Collected: 03/20/23 2118    Specimen: Blood Updated: 03/20/23 2158     Glucose 138 mg/dL      BUN 14 mg/dL      Creatinine 1.11 mg/dL      Sodium 140 mmol/L      Potassium 3.7 mmol/L      Chloride 102 mmol/L      CO2 24.0 mmol/L      Calcium 9.6 mg/dL      Total Protein 8.0 g/dL      Albumin 4.7 g/dL      ALT (SGPT) 26 U/L      AST (SGOT) 27 U/L      Alkaline Phosphatase 100 U/L      Total Bilirubin 0.4 mg/dL      Globulin 3.3 gm/dL      A/G Ratio 1.4 g/dL      BUN/Creatinine Ratio 12.6     Anion Gap 14.0 mmol/L      eGFR 85.0 mL/min/1.73     Narrative:      GFR Normal >60  Chronic Kidney Disease <60  Kidney Failure <15      CBC Auto Differential [[id number]]  (Abnormal) Collected: 03/20/23 2118     Specimen: Blood Updated: 03/20/23 2140     WBC 18.94 10*3/mm3      RBC 5.60 10*6/mm3      Hemoglobin 14.7 g/dL      Hematocrit 44.9 %      MCV 80.2 fL      MCH 26.3 pg      MCHC 32.7 g/dL      RDW 13.2 %      RDW-SD 38.2 fl      MPV 9.9 fL      Platelets 324 10*3/mm3      Neutrophil % 87.1 %      Lymphocyte % 7.1 %      Monocyte % 4.2 %      Eosinophil % 0.7 %      Basophil % 0.6 %      Immature Grans % 0.3 %      Neutrophils, Absolute 16.49 10*3/mm3      Lymphocytes, Absolute 1.35 10*3/mm3      Monocytes, Absolute 0.80 10*3/mm3      Eosinophils, Absolute 0.13 10*3/mm3      Basophils, Absolute 0.11 10*3/mm3      Immature Grans, Absolute 0.06 10*3/mm3      nRBC 0.0 /100 WBC     Urinalysis With Culture If Indicated - Urine, Clean Catch [[id number]]  (Abnormal) Collected: 03/20/23 2127    Specimen: Urine, Clean Catch Updated: 03/20/23 2143     Color, UA Yellow     Appearance, UA Clear     pH, UA 7.5     Specific Gravity, UA 1.020     Glucose, UA Negative     Ketones, UA Trace     Bilirubin, UA Negative     Blood, UA Large (3+)     Protein, UA Trace     Leuk Esterase, UA Trace     Nitrite, UA Negative     Urobilinogen, UA 1.0 E.U./dL    Narrative:      In absence of clinical symptoms, the presence of pyuria, bacteria, and/or nitrites on the urinalysis result does not correlate with infection.    Urinalysis, Microscopic Only - Urine, Clean Catch [[id number]]  (Abnormal) Collected: 03/20/23 2127    Specimen: Urine, Clean Catch Updated: 03/20/23 2143     RBC, UA Too Numerous to Count /HPF      WBC, UA 3-5 /HPF      Comment: Urine culture not indicated.        Bacteria, UA None Seen /HPF      Squamous Epithelial Cells, UA None Seen /HPF      Hyaline Casts, UA 0-2 /LPF      Methodology Automated Microscopy          CT Abdomen Pelvis Without Contrast   Final Result          ED Course  ED Course as of 03/20/23 2228   Mon Mar 20, 2023   2212 I told the patient he does have a kidney stone on the left side that measures 7 x 5 mm.   Right now he seems to be tolerating it pretty well so we will try to give this some time and see if he can pass by itself but if it does not he should follow-up with urology for further evaluation and treatment.  I will give him pain medication and anti-inflammatories and Flomax and nausea medicine.  I did tell him also of the cyst noted on his pancreas and he needs to have that checked every 6 months.  He needs to follow-up with his family doctor about it to get that rechecked every so often.  This was an incidental finding.  He is discharged in stable condition. [TR]      ED Course User Index  [TR] Severo Venegas Jr., MD          MDM  Number of Diagnoses or Management Options  Kidney stone on left side: new and requires workup  Pancreatic cyst: new and requires workup     Amount and/or Complexity of Data Reviewed  Clinical lab tests: ordered and reviewed  Tests in the radiology section of CPT®: ordered and reviewed    Risk of Complications, Morbidity, and/or Mortality  Presenting problems: moderate  Diagnostic procedures: moderate  Management options: moderate    Patient Progress  Patient progress: stable      Final diagnoses:   Kidney stone on left side   Pancreatic cyst          Severo Venegas Jr., MD  03/20/23 7681

## 2023-03-22 NOTE — PROGRESS NOTES
Subjective    Mr. Spencer is 42 y.o. male    Chief Complaint: Left proximal ureteral obstructing stone    History of Present Illness    42-year-old male new patient referred for new finding of 7 mm left proximal ureteral obstructing stone causing mild hydronephrosis on recent CT imaging when patient presented to the emergency room on 3/20/2023 after experiencing a day and a half of bloating, left flank pain and nausea.  Denies any prior personal history of kidney stones.  Reports family history in father of stones.  With father being a uric acid stone former on allopurinol.    At present patient reports that the pain has subsided as of yesterday patient has not had to take any Toradol since yesterday.  Patient remains on tamsulosin since ER visit.    The following portions of the patient's history were reviewed and updated as appropriate: allergies, current medications, past family history, past medical history, past social history, past surgical history and problem list.    Review of Systems   Constitutional: Positive for chills (monday). Negative for fever.   Gastrointestinal: Positive for nausea. Negative for abdominal pain, anal bleeding, blood in stool and vomiting.   Genitourinary: Positive for flank pain and urgency. Negative for decreased urine volume, dysuria, frequency and hematuria.   Musculoskeletal: Positive for back pain.         Current Outpatient Medications:   •  Cholecalciferol (Vitamin D3) 1.25 MG (41546 UT) capsule, , Disp: , Rfl:   •  econazole nitrate (SPECTAZOLE) 1 % cream, 1 application Daily., Disp: , Rfl:   •  fluticasone (FLONASE) 50 MCG/ACT nasal spray, 2 sprays into the nostril(s) as directed by provider 2 (Two) Times a Day., Disp: 48 g, Rfl: 3  •  fluticasone (FLONASE) 50 MCG/ACT nasal spray, Daily., Disp: , Rfl:   •  hydroCHLOROthiazide (HYDRODIURIL) 12.5 MG tablet, Take 1 tablet by mouth Every Morning., Disp: , Rfl:   •  HYDROcodone-acetaminophen (NORCO) 7.5-325 MG per tablet, Take 1  tablet by mouth Every 4 (Four) Hours As Needed for Moderate Pain., Disp: 10 tablet, Rfl: 0  •  ketorolac (TORADOL) 10 MG tablet, Take 1 tablet by mouth Every 6 (Six) Hours As Needed for Moderate Pain., Disp: 15 tablet, Rfl: 0  •  lisinopril (PRINIVIL,ZESTRIL) 40 MG tablet, , Disp: , Rfl:   •  ondansetron (ZOFRAN) 8 MG tablet, Daily., Disp: , Rfl:   •  ondansetron ODT (ZOFRAN-ODT) 4 MG disintegrating tablet, Place 1 tablet on the tongue Every 4 (Four) Hours As Needed for Nausea or Vomiting., Disp: 10 tablet, Rfl: 0  •  tamsulosin (FLOMAX) 0.4 MG capsule 24 hr capsule, Take 1 capsule by mouth 2 (Two) Times a Day., Disp: 60 capsule, Rfl: 0    Past Medical History:   Diagnosis Date   • Bell's palsy    • Hypertension    • Sleep apnea        History reviewed. No pertinent surgical history.    Social History     Socioeconomic History   • Marital status:    Tobacco Use   • Smoking status: Never   • Smokeless tobacco: Never   Vaping Use   • Vaping Use: Never used   Substance and Sexual Activity   • Alcohol use: Never   • Drug use: Never       Family History   Problem Relation Age of Onset   • Diabetes type II Father    • Heart disease Father    • Diabetes type II Maternal Grandmother        Objective    There were no vitals taken for this visit.    Physical Exam  Constitutional:       Appearance: Normal appearance.   Abdominal:      Tenderness: There is no right CVA tenderness or left CVA tenderness.   Skin:     General: Skin is warm and dry.   Neurological:      Mental Status: He is alert and oriented to person, place, and time.   Psychiatric:         Mood and Affect: Mood normal.         Behavior: Behavior normal.             Results for orders placed or performed in visit on 03/23/23   POC Urinalysis Dipstick, Multipro    Specimen: Urine   Result Value Ref Range    Color Yellow Yellow, Straw, Dark Yellow, Vesna    Clarity, UA Clear Clear    Glucose, UA Negative Negative mg/dL    Bilirubin Negative Negative     Ketones, UA Negative Negative    Specific Gravity  1.010 1.005 - 1.030    Blood, UA Trace (A) Negative    pH, Urine 6.5 5.0 - 8.0    Protein, POC Negative Negative mg/dL    Urobilinogen, UA 0.2 E.U./dL Normal, 0.2 E.U./dL    Nitrite, UA Negative Negative    Leukocytes Negative Negative   KUB independent review    A KUB is available for me to review today.  The image is inspected for a bowel gas pattern and the general bone structure of the spine and pelvis. The kidneys are then inspected closely.  Renal outline is noted if identifiable. The kidney, collecting system, and anticipated path of the ureter are examined for calcifications including those in the true pelvis.  This film reveals:    On the right there are multiple renal stones. 3-5mm.    On the left there are multiple renal stones. 2-5mm.    CT Abdomen Pelvis Without Contrast (03/20/2023 21:29)    Assessment and Plan    Diagnoses and all orders for this visit:    1. Kidney stone (Primary)  -     POC Urinalysis Dipstick, Multipro  -     XR abdomen kub; Future  -     Cancel: US Renal Bilateral; Future  -     Cancel: XR Abdomen KUB; Future  -     tamsulosin (FLOMAX) 0.4 MG capsule 24 hr capsule; Take 1 capsule by mouth 2 (Two) Times a Day.  Dispense: 60 capsule; Refill: 0  -     US Renal Bilateral; Future  -     Cancel: XR Abdomen KUB; Future  -     XR Abdomen KUB; Future      42-year-old male new patient referred for new finding of 7 mm left proximal ureteral obstructing stone causing mild hydronephrosis on recent CT imaging     Based on KUB imaging today patient appears to have bilateral nonobstructing renal stones seen as previously mentioned on CT however the 7 mm proximal ureteral stone is not visualized.    Given that patient's pain has subsided however did not physically see any passage of stone recommend patient follow-up in 3 weeks with renal ultrasound and KUB prior

## 2023-03-23 ENCOUNTER — OFFICE VISIT (OUTPATIENT)
Dept: UROLOGY | Facility: CLINIC | Age: 43
End: 2023-03-23
Payer: COMMERCIAL

## 2023-03-23 ENCOUNTER — HOSPITAL ENCOUNTER (OUTPATIENT)
Dept: GENERAL RADIOLOGY | Facility: HOSPITAL | Age: 43
Discharge: HOME | End: 2023-03-23
Payer: COMMERCIAL

## 2023-03-23 DIAGNOSIS — N20.0 KIDNEY STONE: ICD-10-CM

## 2023-03-23 DIAGNOSIS — N20.0 KIDNEY STONE: Primary | ICD-10-CM

## 2023-03-23 LAB
BILIRUB BLD-MCNC: NEGATIVE MG/DL
CLARITY, POC: CLEAR
COLOR UR: YELLOW
GLUCOSE UR STRIP-MCNC: NEGATIVE MG/DL
KETONES UR QL: NEGATIVE
LEUKOCYTE EST, POC: NEGATIVE
NITRITE UR-MCNC: NEGATIVE MG/ML
PH UR: 6.5 [PH] (ref 5–8)
PROT UR STRIP-MCNC: NEGATIVE MG/DL
RBC # UR STRIP: ABNORMAL /UL
SP GR UR: 1.01 (ref 1–1.03)
UROBILINOGEN UR QL: ABNORMAL

## 2023-03-23 PROCEDURE — 81001 URINALYSIS AUTO W/SCOPE: CPT

## 2023-03-23 PROCEDURE — 74018 RADEX ABDOMEN 1 VIEW: CPT

## 2023-03-23 PROCEDURE — 99204 OFFICE O/P NEW MOD 45 MIN: CPT

## 2023-03-23 RX ORDER — ONDANSETRON HYDROCHLORIDE 8 MG/1
TABLET, FILM COATED ORAL EVERY 24 HOURS
COMMUNITY

## 2023-03-23 RX ORDER — FLUTICASONE PROPIONATE 50 MCG
SPRAY, SUSPENSION (ML) NASAL EVERY 24 HOURS
COMMUNITY

## 2023-03-23 RX ORDER — TAMSULOSIN HYDROCHLORIDE 0.4 MG/1
1 CAPSULE ORAL 2 TIMES DAILY
Qty: 60 CAPSULE | Refills: 0 | Status: SHIPPED | OUTPATIENT
Start: 2023-03-23

## 2023-03-28 ENCOUNTER — TELEPHONE (OUTPATIENT)
Dept: UROLOGY | Facility: CLINIC | Age: 43
End: 2023-03-28
Payer: COMMERCIAL

## 2023-03-28 NOTE — TELEPHONE ENCOUNTER
Called patient with information below- patient v/u      ----- Message from NGA Ridley sent at 3/28/2023  1:04 PM CDT -----  Regarding: RE: medication change  Just go back to once daily dosing is fine. But really increase fluid intake  ----- Message -----  From: Melonie Crawford  Sent: 3/28/2023   8:46 AM CDT  To: NGA Ridley  Subject: medication change                                Patient was seen in the office last Thursday and you doubled his Flowmax.  He called to state that he quit taking it because it was making him dizzy and lightheaded for 3-4 hours after he took it. He would like to know what you would like him to do about the medication.     [phone]

## 2023-04-06 NOTE — H&P (VIEW-ONLY)
Subjective    Mr. Spencer is 42 y.o. male    Chief Complaint: Follow-up regarding left proximal ureteral stone    History of Present Illness      42-year-old male established patient in for 3-week follow-up regarding recent finding of 7 left proximal ureteral obstructing stone.  At last visit patient had questioned whether stone was still present as patient's pain had resolved and were unable to tell 100% if stone remained based on previous KUB therefore patient opted to continue to take Flomax at home to try expulsive therapy if stone had remained.  Patient presents at this time stating that he has experienced intermittent pain off and on however not severe over the last 3 weeks and feels as though the stone may in fact still be present.  Has not had to take any ketorolac for at least 1 week.    Patient underwent renal ultrasound prior to visit today revealing that the left-sided hydronephrosis remains.     Denies any prior personal history of kidney stones.  Reports family history in father of stones.  With father being a uric acid stone former on allopurinol.     The following portions of the patient's history were reviewed and updated as appropriate: allergies, current medications, past family history, past medical history, past social history, past surgical history and problem list.    Review of Systems   Constitutional: Negative for chills, fatigue and fever.   Gastrointestinal: Positive for nausea. Negative for vomiting.   Genitourinary: Positive for flank pain. Negative for decreased urine volume, difficulty urinating, dysuria, frequency, hematuria and urgency.         Current Outpatient Medications:   •  Cholecalciferol (Vitamin D3) 1.25 MG (55601 UT) capsule, , Disp: , Rfl:   •  econazole nitrate (SPECTAZOLE) 1 % cream, 1 application Daily., Disp: , Rfl:   •  fluticasone (FLONASE) 50 MCG/ACT nasal spray, 2 sprays into the nostril(s) as directed by provider 2 (Two) Times a Day., Disp: 48 g, Rfl: 3  •   "fluticasone (FLONASE) 50 MCG/ACT nasal spray, Daily., Disp: , Rfl:   •  hydroCHLOROthiazide (HYDRODIURIL) 12.5 MG tablet, Take 1 tablet by mouth Every Morning., Disp: , Rfl:   •  ketorolac (TORADOL) 10 MG tablet, Take 1 tablet by mouth Every 6 (Six) Hours As Needed for Moderate Pain., Disp: 15 tablet, Rfl: 0  •  lisinopril (PRINIVIL,ZESTRIL) 40 MG tablet, , Disp: , Rfl:   •  ondansetron (ZOFRAN) 8 MG tablet, Daily., Disp: , Rfl:   •  ondansetron ODT (ZOFRAN-ODT) 4 MG disintegrating tablet, Place 1 tablet on the tongue Every 4 (Four) Hours As Needed for Nausea or Vomiting., Disp: 10 tablet, Rfl: 0  •  tamsulosin (FLOMAX) 0.4 MG capsule 24 hr capsule, Take 1 capsule by mouth 2 (Two) Times a Day., Disp: 60 capsule, Rfl: 0  •  HYDROcodone-acetaminophen (NORCO) 7.5-325 MG per tablet, Take 1 tablet by mouth Every 4 (Four) Hours As Needed for Moderate Pain. (Patient not taking: Reported on 4/14/2023), Disp: 10 tablet, Rfl: 0    Past Medical History:   Diagnosis Date   • Bell's palsy    • Hypertension    • Sleep apnea        History reviewed. No pertinent surgical history.    Social History     Socioeconomic History   • Marital status:    Tobacco Use   • Smoking status: Never   • Smokeless tobacco: Never   Vaping Use   • Vaping Use: Never used   Substance and Sexual Activity   • Alcohol use: Never   • Drug use: Never       Family History   Problem Relation Age of Onset   • Diabetes type II Father    • Heart disease Father    • Diabetes type II Maternal Grandmother        Objective    Temp 97.6 °F (36.4 °C)   Ht 177.8 cm (70\")   Wt 134 kg (294 lb 12.8 oz)   BMI 42.30 kg/m²     Physical Exam  Constitutional:       Appearance: Normal appearance.   Abdominal:      Tenderness: There is left CVA tenderness. There is no right CVA tenderness.   Neurological:      Mental Status: He is alert and oriented to person, place, and time.   Psychiatric:         Mood and Affect: Mood normal.         Behavior: Behavior normal. "             Results for orders placed or performed in visit on 04/14/23   POC Urinalysis Dipstick, Multipro    Specimen: Urine   Result Value Ref Range    Color Yellow Yellow, Straw, Dark Yellow, Vesna    Clarity, UA Clear Clear    Glucose, UA Negative Negative mg/dL    Bilirubin Negative Negative    Ketones, UA Negative Negative    Specific Gravity  1.015 1.005 - 1.030    Blood, UA Negative Negative    pH, Urine 6.5 5.0 - 8.0    Protein, POC Negative Negative mg/dL    Urobilinogen, UA 0.2 E.U./dL Normal, 0.2 E.U./dL    Nitrite, UA Negative Negative    Leukocytes Negative Negative   KUB independent review    A KUB is available for me to review today.  The image is inspected for a bowel gas pattern and the general bone structure of the spine and pelvis. The kidneys are then inspected closely.  Renal outline is noted if identifiable. The kidney, collecting system, and anticipated path of the ureter are examined for calcifications including those in the true pelvis.  This film reveals:    On the right there are 2 stones seen in the right upper pole.    On the left there is a single proximal ureteral stone measuring 7 mm. Along with 3 renal stones mid to lower pole    Independent renal ultrasound review  The renal ultrasound is available for me to review.  Treatment recommendations require an independent review.  This film has been reviewed by the radiologist to determine any non urologic abnormalities that are presents.  I inspected the kidneys for size, symmetry, contour, parenchymal thickness, perinephric reaction, presence of calcifications, and intrarenal dilation of the collecting system.  This US shows mild hydronephrosis remains  Assessment and Plan    Diagnoses and all orders for this visit:    1. Kidney stone (Primary)  -     POC Urinalysis Dipstick, Multipro  -     Case Request; Standing  -     ECG 12 Lead; Future  -     sodium chloride 0.9 % infusion  -     CBC (No Diff); Future  -     Basic Metabolic Panel;  Future  -     Protime-INR; Future  -     aPTT; Future  -     clindamycin (CLEOCIN) 900 mg in dextrose (D5W) 5 % 100 mL IVPB  -     Case Request    Other orders  -     Follow Anesthesia Guidelines / Protocol; Future  -     Obtain Informed Consent; Future  -     Provide NPO Instructions to Patient; Future  -     Chlorhexidine Skin Prep; Future  -     Follow Anesthesia Guidelines / Protocol; Standing  -     Verify / Perform Chlorhexidine Skin Prep; Standing  -     Verify / Perform Chlorhexidine Skin Prep if Indicated (If Not Already Completed); Standing  -     XR Abdomen KUB; Standing    42-year-old male established patient in for 3-week follow-up regarding recent finding of 7 left proximal ureteral obstructing stone    Renal ultrasound from earlier this week revealing ongoing mild left-sided hydronephrosis    KUB imaging today showing the 7 mm left proximal ureteral stone remains    Given that the stone is seen well on KUB imaging patient would like to proceed with surgical intervention left ESWL today with Dr. Avila.  Patient was educated on the risks and benefits of the procedure as well as possible side effects.  Patient has been off of all NSAID use for at least 1 week at this point and is on no blood thinning medications.

## 2023-04-06 NOTE — PROGRESS NOTES
Subjective    Mr. Spencer is 42 y.o. male    Chief Complaint: Follow-up regarding left proximal ureteral stone    History of Present Illness      42-year-old male established patient in for 3-week follow-up regarding recent finding of 7 left proximal ureteral obstructing stone.  At last visit patient had questioned whether stone was still present as patient's pain had resolved and were unable to tell 100% if stone remained based on previous KUB therefore patient opted to continue to take Flomax at home to try expulsive therapy if stone had remained.  Patient presents at this time stating that he has experienced intermittent pain off and on however not severe over the last 3 weeks and feels as though the stone may in fact still be present.  Has not had to take any ketorolac for at least 1 week.    Patient underwent renal ultrasound prior to visit today revealing that the left-sided hydronephrosis remains.     Denies any prior personal history of kidney stones.  Reports family history in father of stones.  With father being a uric acid stone former on allopurinol.     The following portions of the patient's history were reviewed and updated as appropriate: allergies, current medications, past family history, past medical history, past social history, past surgical history and problem list.    Review of Systems   Constitutional: Negative for chills, fatigue and fever.   Gastrointestinal: Positive for nausea. Negative for vomiting.   Genitourinary: Positive for flank pain. Negative for decreased urine volume, difficulty urinating, dysuria, frequency, hematuria and urgency.         Current Outpatient Medications:   •  Cholecalciferol (Vitamin D3) 1.25 MG (80316 UT) capsule, , Disp: , Rfl:   •  econazole nitrate (SPECTAZOLE) 1 % cream, 1 application Daily., Disp: , Rfl:   •  fluticasone (FLONASE) 50 MCG/ACT nasal spray, 2 sprays into the nostril(s) as directed by provider 2 (Two) Times a Day., Disp: 48 g, Rfl: 3  •   "fluticasone (FLONASE) 50 MCG/ACT nasal spray, Daily., Disp: , Rfl:   •  hydroCHLOROthiazide (HYDRODIURIL) 12.5 MG tablet, Take 1 tablet by mouth Every Morning., Disp: , Rfl:   •  ketorolac (TORADOL) 10 MG tablet, Take 1 tablet by mouth Every 6 (Six) Hours As Needed for Moderate Pain., Disp: 15 tablet, Rfl: 0  •  lisinopril (PRINIVIL,ZESTRIL) 40 MG tablet, , Disp: , Rfl:   •  ondansetron (ZOFRAN) 8 MG tablet, Daily., Disp: , Rfl:   •  ondansetron ODT (ZOFRAN-ODT) 4 MG disintegrating tablet, Place 1 tablet on the tongue Every 4 (Four) Hours As Needed for Nausea or Vomiting., Disp: 10 tablet, Rfl: 0  •  tamsulosin (FLOMAX) 0.4 MG capsule 24 hr capsule, Take 1 capsule by mouth 2 (Two) Times a Day., Disp: 60 capsule, Rfl: 0  •  HYDROcodone-acetaminophen (NORCO) 7.5-325 MG per tablet, Take 1 tablet by mouth Every 4 (Four) Hours As Needed for Moderate Pain. (Patient not taking: Reported on 4/14/2023), Disp: 10 tablet, Rfl: 0    Past Medical History:   Diagnosis Date   • Bell's palsy    • Hypertension    • Sleep apnea        History reviewed. No pertinent surgical history.    Social History     Socioeconomic History   • Marital status:    Tobacco Use   • Smoking status: Never   • Smokeless tobacco: Never   Vaping Use   • Vaping Use: Never used   Substance and Sexual Activity   • Alcohol use: Never   • Drug use: Never       Family History   Problem Relation Age of Onset   • Diabetes type II Father    • Heart disease Father    • Diabetes type II Maternal Grandmother        Objective    Temp 97.6 °F (36.4 °C)   Ht 177.8 cm (70\")   Wt 134 kg (294 lb 12.8 oz)   BMI 42.30 kg/m²     Physical Exam  Constitutional:       Appearance: Normal appearance.   Abdominal:      Tenderness: There is left CVA tenderness. There is no right CVA tenderness.   Neurological:      Mental Status: He is alert and oriented to person, place, and time.   Psychiatric:         Mood and Affect: Mood normal.         Behavior: Behavior normal. "             Results for orders placed or performed in visit on 04/14/23   POC Urinalysis Dipstick, Multipro    Specimen: Urine   Result Value Ref Range    Color Yellow Yellow, Straw, Dark Yellow, Vesna    Clarity, UA Clear Clear    Glucose, UA Negative Negative mg/dL    Bilirubin Negative Negative    Ketones, UA Negative Negative    Specific Gravity  1.015 1.005 - 1.030    Blood, UA Negative Negative    pH, Urine 6.5 5.0 - 8.0    Protein, POC Negative Negative mg/dL    Urobilinogen, UA 0.2 E.U./dL Normal, 0.2 E.U./dL    Nitrite, UA Negative Negative    Leukocytes Negative Negative   KUB independent review    A KUB is available for me to review today.  The image is inspected for a bowel gas pattern and the general bone structure of the spine and pelvis. The kidneys are then inspected closely.  Renal outline is noted if identifiable. The kidney, collecting system, and anticipated path of the ureter are examined for calcifications including those in the true pelvis.  This film reveals:    On the right there are 2 stones seen in the right upper pole.    On the left there is a single proximal ureteral stone measuring 7 mm. Along with 3 renal stones mid to lower pole    Independent renal ultrasound review  The renal ultrasound is available for me to review.  Treatment recommendations require an independent review.  This film has been reviewed by the radiologist to determine any non urologic abnormalities that are presents.  I inspected the kidneys for size, symmetry, contour, parenchymal thickness, perinephric reaction, presence of calcifications, and intrarenal dilation of the collecting system.  This US shows mild hydronephrosis remains  Assessment and Plan    Diagnoses and all orders for this visit:    1. Kidney stone (Primary)  -     POC Urinalysis Dipstick, Multipro  -     Case Request; Standing  -     ECG 12 Lead; Future  -     sodium chloride 0.9 % infusion  -     CBC (No Diff); Future  -     Basic Metabolic Panel;  Future  -     Protime-INR; Future  -     aPTT; Future  -     clindamycin (CLEOCIN) 900 mg in dextrose (D5W) 5 % 100 mL IVPB  -     Case Request    Other orders  -     Follow Anesthesia Guidelines / Protocol; Future  -     Obtain Informed Consent; Future  -     Provide NPO Instructions to Patient; Future  -     Chlorhexidine Skin Prep; Future  -     Follow Anesthesia Guidelines / Protocol; Standing  -     Verify / Perform Chlorhexidine Skin Prep; Standing  -     Verify / Perform Chlorhexidine Skin Prep if Indicated (If Not Already Completed); Standing  -     XR Abdomen KUB; Standing    42-year-old male established patient in for 3-week follow-up regarding recent finding of 7 left proximal ureteral obstructing stone    Renal ultrasound from earlier this week revealing ongoing mild left-sided hydronephrosis    KUB imaging today showing the 7 mm left proximal ureteral stone remains    Given that the stone is seen well on KUB imaging patient would like to proceed with surgical intervention left ESWL today with Dr. Avila.  Patient was educated on the risks and benefits of the procedure as well as possible side effects.  Patient has been off of all NSAID use for at least 1 week at this point and is on no blood thinning medications.

## 2023-04-13 DIAGNOSIS — N20.0 KIDNEY STONE: ICD-10-CM

## 2023-04-14 ENCOUNTER — HOSPITAL ENCOUNTER (OUTPATIENT)
Facility: HOSPITAL | Age: 43
Setting detail: HOSPITAL OUTPATIENT SURGERY
Discharge: HOME | End: 2023-04-14
Attending: UROLOGY | Admitting: UROLOGY
Payer: COMMERCIAL

## 2023-04-14 ENCOUNTER — OFFICE VISIT (OUTPATIENT)
Dept: UROLOGY | Facility: CLINIC | Age: 43
End: 2023-04-14
Payer: COMMERCIAL

## 2023-04-14 ENCOUNTER — APPOINTMENT (OUTPATIENT)
Dept: GENERAL RADIOLOGY | Facility: HOSPITAL | Age: 43
End: 2023-04-14
Payer: COMMERCIAL

## 2023-04-14 ENCOUNTER — ANESTHESIA EVENT (OUTPATIENT)
Dept: PERIOP | Facility: HOSPITAL | Age: 43
End: 2023-04-14
Payer: COMMERCIAL

## 2023-04-14 ENCOUNTER — ANESTHESIA (OUTPATIENT)
Dept: PERIOP | Facility: HOSPITAL | Age: 43
End: 2023-04-14
Payer: COMMERCIAL

## 2023-04-14 ENCOUNTER — HOSPITAL ENCOUNTER (OUTPATIENT)
Dept: GENERAL RADIOLOGY | Facility: HOSPITAL | Age: 43
Discharge: HOME | End: 2023-04-14
Payer: COMMERCIAL

## 2023-04-14 VITALS
WEIGHT: 294.31 LBS | TEMPERATURE: 97.5 F | BODY MASS INDEX: 42.13 KG/M2 | OXYGEN SATURATION: 95 % | HEIGHT: 70 IN | HEART RATE: 105 BPM | SYSTOLIC BLOOD PRESSURE: 128 MMHG | DIASTOLIC BLOOD PRESSURE: 84 MMHG | RESPIRATION RATE: 16 BRPM

## 2023-04-14 VITALS — BODY MASS INDEX: 42.2 KG/M2 | TEMPERATURE: 97.6 F | WEIGHT: 294.8 LBS | HEIGHT: 70 IN

## 2023-04-14 DIAGNOSIS — N20.0 KIDNEY STONE: ICD-10-CM

## 2023-04-14 DIAGNOSIS — N20.0 KIDNEY STONE: Primary | ICD-10-CM

## 2023-04-14 LAB
ANION GAP SERPL CALCULATED.3IONS-SCNC: 12 MMOL/L (ref 5–15)
APTT PPP: 34.9 SECONDS (ref 24.1–35)
BILIRUB BLD-MCNC: NEGATIVE MG/DL
BUN SERPL-MCNC: 13 MG/DL (ref 6–20)
BUN/CREAT SERPL: 14.8 (ref 7–25)
CALCIUM SPEC-SCNC: 9.5 MG/DL (ref 8.6–10.5)
CHLORIDE SERPL-SCNC: 101 MMOL/L (ref 98–107)
CLARITY, POC: CLEAR
CO2 SERPL-SCNC: 25 MMOL/L (ref 22–29)
COLOR UR: YELLOW
CREAT SERPL-MCNC: 0.88 MG/DL (ref 0.76–1.27)
DEPRECATED RDW RBC AUTO: 38.6 FL (ref 37–54)
EGFRCR SERPLBLD CKD-EPI 2021: 110.1 ML/MIN/1.73
ERYTHROCYTE [DISTWIDTH] IN BLOOD BY AUTOMATED COUNT: 13.3 % (ref 12.3–15.4)
GLUCOSE SERPL-MCNC: 76 MG/DL (ref 65–99)
GLUCOSE UR STRIP-MCNC: NEGATIVE MG/DL
HCT VFR BLD AUTO: 43.8 % (ref 37.5–51)
HGB BLD-MCNC: 14.5 G/DL (ref 13–17.7)
INR PPP: 0.99 (ref 0.91–1.09)
KETONES UR QL: NEGATIVE
LEUKOCYTE EST, POC: NEGATIVE
MCH RBC QN AUTO: 26.6 PG (ref 26.6–33)
MCHC RBC AUTO-ENTMCNC: 33.1 G/DL (ref 31.5–35.7)
MCV RBC AUTO: 80.2 FL (ref 79–97)
NITRITE UR-MCNC: NEGATIVE MG/ML
PH UR: 6.5 [PH] (ref 5–8)
PLATELET # BLD AUTO: 360 10*3/MM3 (ref 140–450)
PMV BLD AUTO: 9.8 FL (ref 6–12)
POTASSIUM SERPL-SCNC: 4 MMOL/L (ref 3.5–5.2)
PROT UR STRIP-MCNC: NEGATIVE MG/DL
PROTHROMBIN TIME: 13.2 SECONDS (ref 11.8–14.8)
RBC # BLD AUTO: 5.46 10*6/MM3 (ref 4.14–5.8)
RBC # UR STRIP: NEGATIVE /UL
SODIUM SERPL-SCNC: 138 MMOL/L (ref 136–145)
SP GR UR: 1.01 (ref 1–1.03)
UROBILINOGEN UR QL: NORMAL
WBC NRBC COR # BLD: 12.05 10*3/MM3 (ref 3.4–10.8)

## 2023-04-14 PROCEDURE — 74018 RADEX ABDOMEN 1 VIEW: CPT

## 2023-04-14 PROCEDURE — 81001 URINALYSIS AUTO W/SCOPE: CPT

## 2023-04-14 PROCEDURE — 25010000002 ONDANSETRON PER 1 MG: Performed by: NURSE ANESTHETIST, CERTIFIED REGISTERED

## 2023-04-14 PROCEDURE — 85027 COMPLETE CBC AUTOMATED: CPT

## 2023-04-14 PROCEDURE — 85730 THROMBOPLASTIN TIME PARTIAL: CPT

## 2023-04-14 PROCEDURE — 25010000002 DROPERIDOL PER 5 MG: Performed by: ANESTHESIOLOGY

## 2023-04-14 PROCEDURE — 80048 BASIC METABOLIC PNL TOTAL CA: CPT

## 2023-04-14 PROCEDURE — 99214 OFFICE O/P EST MOD 30 MIN: CPT

## 2023-04-14 PROCEDURE — 25010000002 MIDAZOLAM PER 1 MG: Performed by: ANESTHESIOLOGY

## 2023-04-14 PROCEDURE — 25010000002 PROPOFOL 10 MG/ML EMULSION: Performed by: NURSE ANESTHETIST, CERTIFIED REGISTERED

## 2023-04-14 PROCEDURE — 50590 FRAGMENTING OF KIDNEY STONE: CPT | Performed by: UROLOGY

## 2023-04-14 PROCEDURE — 85610 PROTHROMBIN TIME: CPT

## 2023-04-14 PROCEDURE — 93005 ELECTROCARDIOGRAM TRACING: CPT

## 2023-04-14 PROCEDURE — 25010000002 FENTANYL CITRATE (PF) 100 MCG/2ML SOLUTION: Performed by: NURSE ANESTHETIST, CERTIFIED REGISTERED

## 2023-04-14 RX ORDER — MIDAZOLAM HYDROCHLORIDE 1 MG/ML
1 INJECTION INTRAMUSCULAR; INTRAVENOUS
Status: DISCONTINUED | OUTPATIENT
Start: 2023-04-14 | End: 2023-04-14 | Stop reason: HOSPADM

## 2023-04-14 RX ORDER — SODIUM CHLORIDE 9 MG/ML
100 INJECTION, SOLUTION INTRAVENOUS CONTINUOUS
Status: DISCONTINUED | OUTPATIENT
Start: 2023-04-14 | End: 2023-04-14 | Stop reason: HOSPADM

## 2023-04-14 RX ORDER — NALOXONE HYDROCHLORIDE 4 MG/.1ML
SPRAY NASAL
Qty: 2 EACH | Refills: 0 | Status: SHIPPED | OUTPATIENT
Start: 2023-04-14

## 2023-04-14 RX ORDER — PROPOFOL 10 MG/ML
VIAL (ML) INTRAVENOUS AS NEEDED
Status: DISCONTINUED | OUTPATIENT
Start: 2023-04-14 | End: 2023-04-14 | Stop reason: SURG

## 2023-04-14 RX ORDER — LABETALOL HYDROCHLORIDE 5 MG/ML
5 INJECTION, SOLUTION INTRAVENOUS
Status: DISCONTINUED | OUTPATIENT
Start: 2023-04-14 | End: 2023-04-14 | Stop reason: HOSPADM

## 2023-04-14 RX ORDER — SODIUM CHLORIDE 0.9 % (FLUSH) 0.9 %
3-10 SYRINGE (ML) INJECTION AS NEEDED
Status: DISCONTINUED | OUTPATIENT
Start: 2023-04-14 | End: 2023-04-14 | Stop reason: HOSPADM

## 2023-04-14 RX ORDER — NALOXONE HCL 0.4 MG/ML
0.4 VIAL (ML) INJECTION AS NEEDED
Status: DISCONTINUED | OUTPATIENT
Start: 2023-04-14 | End: 2023-04-14 | Stop reason: HOSPADM

## 2023-04-14 RX ORDER — LIDOCAINE HYDROCHLORIDE 20 MG/ML
INJECTION, SOLUTION EPIDURAL; INFILTRATION; INTRACAUDAL; PERINEURAL AS NEEDED
Status: DISCONTINUED | OUTPATIENT
Start: 2023-04-14 | End: 2023-04-14 | Stop reason: SURG

## 2023-04-14 RX ORDER — OXYCODONE HYDROCHLORIDE AND ACETAMINOPHEN 5; 325 MG/1; MG/1
1 TABLET ORAL EVERY 4 HOURS PRN
Qty: 12 TABLET | Refills: 0 | Status: SHIPPED | OUTPATIENT
Start: 2023-04-14

## 2023-04-14 RX ORDER — DROPERIDOL 2.5 MG/ML
0.62 INJECTION, SOLUTION INTRAMUSCULAR; INTRAVENOUS ONCE AS NEEDED
Status: COMPLETED | OUTPATIENT
Start: 2023-04-14 | End: 2023-04-14

## 2023-04-14 RX ORDER — IBUPROFEN 600 MG/1
600 TABLET ORAL ONCE AS NEEDED
Status: DISCONTINUED | OUTPATIENT
Start: 2023-04-14 | End: 2023-04-14 | Stop reason: HOSPADM

## 2023-04-14 RX ORDER — FENTANYL CITRATE 50 UG/ML
25 INJECTION, SOLUTION INTRAMUSCULAR; INTRAVENOUS
Status: DISCONTINUED | OUTPATIENT
Start: 2023-04-14 | End: 2023-04-14 | Stop reason: HOSPADM

## 2023-04-14 RX ORDER — DROPERIDOL 2.5 MG/ML
0.62 INJECTION, SOLUTION INTRAMUSCULAR; INTRAVENOUS ONCE AS NEEDED
Status: DISCONTINUED | OUTPATIENT
Start: 2023-04-14 | End: 2023-04-14 | Stop reason: HOSPADM

## 2023-04-14 RX ORDER — LIDOCAINE HYDROCHLORIDE 10 MG/ML
0.5 INJECTION, SOLUTION EPIDURAL; INFILTRATION; INTRACAUDAL; PERINEURAL ONCE AS NEEDED
Status: DISCONTINUED | OUTPATIENT
Start: 2023-04-14 | End: 2023-04-14 | Stop reason: HOSPADM

## 2023-04-14 RX ORDER — ONDANSETRON 2 MG/ML
4 INJECTION INTRAMUSCULAR; INTRAVENOUS ONCE AS NEEDED
Status: DISCONTINUED | OUTPATIENT
Start: 2023-04-14 | End: 2023-04-14 | Stop reason: HOSPADM

## 2023-04-14 RX ORDER — ONDANSETRON 2 MG/ML
INJECTION INTRAMUSCULAR; INTRAVENOUS AS NEEDED
Status: DISCONTINUED | OUTPATIENT
Start: 2023-04-14 | End: 2023-04-14 | Stop reason: SURG

## 2023-04-14 RX ORDER — SODIUM CHLORIDE 0.9 % (FLUSH) 0.9 %
3 SYRINGE (ML) INJECTION EVERY 12 HOURS SCHEDULED
Status: DISCONTINUED | OUTPATIENT
Start: 2023-04-14 | End: 2023-04-14 | Stop reason: HOSPADM

## 2023-04-14 RX ORDER — ACETAMINOPHEN 500 MG
1000 TABLET ORAL ONCE
Status: COMPLETED | OUTPATIENT
Start: 2023-04-14 | End: 2023-04-14

## 2023-04-14 RX ORDER — OXYCODONE AND ACETAMINOPHEN 7.5; 325 MG/1; MG/1
2 TABLET ORAL EVERY 4 HOURS PRN
Status: DISCONTINUED | OUTPATIENT
Start: 2023-04-14 | End: 2023-04-14 | Stop reason: HOSPADM

## 2023-04-14 RX ORDER — SCOLOPAMINE TRANSDERMAL SYSTEM 1 MG/1
1 PATCH, EXTENDED RELEASE TRANSDERMAL ONCE
Status: DISCONTINUED | OUTPATIENT
Start: 2023-04-14 | End: 2023-04-14 | Stop reason: HOSPADM

## 2023-04-14 RX ORDER — OXYCODONE HYDROCHLORIDE AND ACETAMINOPHEN 5; 325 MG/1; MG/1
1 TABLET ORAL ONCE AS NEEDED
Status: DISCONTINUED | OUTPATIENT
Start: 2023-04-14 | End: 2023-04-14 | Stop reason: HOSPADM

## 2023-04-14 RX ORDER — CLINDAMYCIN PHOSPHATE 900 MG/50ML
900 INJECTION INTRAVENOUS ONCE
Status: COMPLETED | OUTPATIENT
Start: 2023-04-14 | End: 2023-04-14

## 2023-04-14 RX ORDER — SODIUM CHLORIDE 0.9 % (FLUSH) 0.9 %
3 SYRINGE (ML) INJECTION AS NEEDED
Status: DISCONTINUED | OUTPATIENT
Start: 2023-04-14 | End: 2023-04-14 | Stop reason: HOSPADM

## 2023-04-14 RX ORDER — SODIUM CHLORIDE, SODIUM LACTATE, POTASSIUM CHLORIDE, CALCIUM CHLORIDE 600; 310; 30; 20 MG/100ML; MG/100ML; MG/100ML; MG/100ML
1000 INJECTION, SOLUTION INTRAVENOUS CONTINUOUS
Status: DISCONTINUED | OUTPATIENT
Start: 2023-04-14 | End: 2023-04-14 | Stop reason: HOSPADM

## 2023-04-14 RX ORDER — SODIUM CHLORIDE, SODIUM LACTATE, POTASSIUM CHLORIDE, CALCIUM CHLORIDE 600; 310; 30; 20 MG/100ML; MG/100ML; MG/100ML; MG/100ML
100 INJECTION, SOLUTION INTRAVENOUS CONTINUOUS
Status: DISCONTINUED | OUTPATIENT
Start: 2023-04-14 | End: 2023-04-14 | Stop reason: HOSPADM

## 2023-04-14 RX ORDER — SODIUM CHLORIDE 9 MG/ML
100 INJECTION, SOLUTION INTRAVENOUS CONTINUOUS
Status: CANCELLED | OUTPATIENT
Start: 2023-04-14

## 2023-04-14 RX ORDER — MIDAZOLAM HYDROCHLORIDE 1 MG/ML
2 INJECTION INTRAMUSCULAR; INTRAVENOUS ONCE
Status: COMPLETED | OUTPATIENT
Start: 2023-04-14 | End: 2023-04-14

## 2023-04-14 RX ORDER — LIDOCAINE HYDROCHLORIDE 10 MG/ML
0.5 INJECTION, SOLUTION EPIDURAL; INFILTRATION; INTRACAUDAL; PERINEURAL ONCE AS NEEDED
Status: DISCONTINUED | OUTPATIENT
Start: 2023-04-14 | End: 2023-04-14 | Stop reason: SDUPTHER

## 2023-04-14 RX ORDER — FENTANYL CITRATE 50 UG/ML
INJECTION, SOLUTION INTRAMUSCULAR; INTRAVENOUS AS NEEDED
Status: DISCONTINUED | OUTPATIENT
Start: 2023-04-14 | End: 2023-04-14 | Stop reason: SURG

## 2023-04-14 RX ORDER — OXYCODONE AND ACETAMINOPHEN 10; 325 MG/1; MG/1
1 TABLET ORAL ONCE AS NEEDED
Status: DISCONTINUED | OUTPATIENT
Start: 2023-04-14 | End: 2023-04-14 | Stop reason: HOSPADM

## 2023-04-14 RX ORDER — ONDANSETRON 4 MG/1
4 TABLET, FILM COATED ORAL ONCE AS NEEDED
Status: DISCONTINUED | OUTPATIENT
Start: 2023-04-14 | End: 2023-04-14 | Stop reason: HOSPADM

## 2023-04-14 RX ORDER — FLUMAZENIL 0.1 MG/ML
0.2 INJECTION INTRAVENOUS AS NEEDED
Status: DISCONTINUED | OUTPATIENT
Start: 2023-04-14 | End: 2023-04-14 | Stop reason: HOSPADM

## 2023-04-14 RX ORDER — SODIUM CHLORIDE 9 MG/ML
40 INJECTION, SOLUTION INTRAVENOUS AS NEEDED
Status: DISCONTINUED | OUTPATIENT
Start: 2023-04-14 | End: 2023-04-14 | Stop reason: HOSPADM

## 2023-04-14 RX ADMIN — SCOPALAMINE 1 PATCH: 1 PATCH, EXTENDED RELEASE TRANSDERMAL at 15:12

## 2023-04-14 RX ADMIN — ACETAMINOPHEN 1000 MG: 500 TABLET, FILM COATED ORAL at 15:11

## 2023-04-14 RX ADMIN — SODIUM CHLORIDE, POTASSIUM CHLORIDE, SODIUM LACTATE AND CALCIUM CHLORIDE 1000 ML: 600; 310; 30; 20 INJECTION, SOLUTION INTRAVENOUS at 12:36

## 2023-04-14 RX ADMIN — DROPERIDOL 0.62 MG: 2.5 INJECTION, SOLUTION INTRAMUSCULAR; INTRAVENOUS at 15:12

## 2023-04-14 RX ADMIN — ONDANSETRON 4 MG: 2 INJECTION INTRAMUSCULAR; INTRAVENOUS at 16:54

## 2023-04-14 RX ADMIN — LIDOCAINE HYDROCHLORIDE 80 MG: 20 INJECTION, SOLUTION EPIDURAL; INFILTRATION; INTRACAUDAL; PERINEURAL at 16:50

## 2023-04-14 RX ADMIN — PROPOFOL INJECTABLE EMULSION 200 MG: 10 INJECTION, EMULSION INTRAVENOUS at 16:50

## 2023-04-14 RX ADMIN — FENTANYL CITRATE 100 MCG: 50 INJECTION INTRAMUSCULAR; INTRAVENOUS at 16:50

## 2023-04-14 RX ADMIN — MIDAZOLAM HYDROCHLORIDE 2 MG: 2 INJECTION, SOLUTION INTRAMUSCULAR; INTRAVENOUS at 15:12

## 2023-04-14 RX ADMIN — CLINDAMYCIN IN 5 PERCENT DEXTROSE 900 MG: 18 INJECTION, SOLUTION INTRAVENOUS at 16:54

## 2023-04-14 NOTE — ANESTHESIA PROCEDURE NOTES
Airway  Urgency: elective    Date/Time: 4/14/2023 4:50 PM  Airway not difficult    General Information and Staff    Patient location during procedure: OR  CRNA/CAA: Tyson Snyder CRNA    Indications and Patient Condition    Preoxygenated: yes  Mask difficulty assessment: 0 - not attempted    Final Airway Details  Final airway type: supraglottic airway      Successful airway: I-gel  Size 5     Number of attempts at approach: 1  Assessment: lips, teeth, and gum same as pre-op

## 2023-04-14 NOTE — ANESTHESIA PREPROCEDURE EVALUATION
Anesthesia Evaluation     Patient summary reviewed   no history of anesthetic complications:  NPO Solid Status: > 6 hours             Airway   Mallampati: II  Dental      Pulmonary    (+) sleep apnea,   (-) not a smoker  Cardiovascular   Exercise tolerance: good (4-7 METS)    (+) hypertension,   (-) pacemaker, valvular problems/murmurs, past MI      Neuro/Psych  (-) seizures, CVA  GI/Hepatic/Renal/Endo    (+)   renal disease stones,     Musculoskeletal     Abdominal    Substance History      OB/GYN          Other                        Anesthesia Plan    ASA 3     general     (Very concerned about PONV )  intravenous induction     Anesthetic plan, risks, benefits, and alternatives have been provided, discussed and informed consent has been obtained with: patient.        CODE STATUS:

## 2023-04-14 NOTE — OP NOTE
OP NOTE  Micah Spencer Jr.    Date of Procedure: 4/14/2023    Pre-op Diagnosis:   Kidney stone [N20.0]    Post-op Diagnosis:     Post-Op Diagnosis Codes:     * Kidney stone [N20.0]    Procedure/CPT® Codes:      Procedure(s):  EXTRACORPOREAL SHOCKWAVE LITHOTRIPSY-left    Surgeon(s):  Mt Avila MD    Anesthesia: General    Staff:   Circulator: Dara Gupta RN  Vendor Representative: Darío Leone    Indications:   Left proximal ureteral calculus.   After discussion of options, patient has given informed consent to undergo left ESWL.  All risks, benefits, and alternatives were discussed.    Operative Report: The patient is identified. The KUB is reviewed. After answering any questions, patient was brought to the operating room and placed on the patient table of the [facility].  General endotracheal anesthesia was administered.  Preoperative KUB was reviewed.   An [facility] c-arm fluoroscope was used to identify the stone.    The shock-head is brought into position.  This stone is brought into the F2 plane for focus.  Treatment was initiated.  We gradually increased the energy level from 1 to 7.  This stone was treated at a rate of 90.  We paused for 2 minutes after 300 shocks to reduce the risk of renal damage.  The stone was periodically checked to make sure that we were on it and getting good breakup.  We checked at 700, 1000, 1500, 2000, and 2500 shocks.  The stone did appear to have good breakup.  I felt it was unnecessary to place a ureteral stent.  At the conclusion of 3000 shocks, the patient was awakened from anesthesia and transferred to the recovery room in satisfactory condition.      Estimated Blood Loss: <30 mL    Specimens:                None      Drains: None    Complications: none  Plan: Repeat KUB in a few weeks. Home today if no evidence of infection or significant bleeding.     Mt Avila MD     Date: 4/14/2023  Time: 17:23 CDT

## 2023-04-14 NOTE — ANESTHESIA POSTPROCEDURE EVALUATION
"Patient: Micah Spencer Jr.    Procedure Summary     Date: 04/14/23 Room / Location:  [facility] OR 06 /  [facility]    Anesthesia Start: 1647 Anesthesia Stop: 1743    Procedure: EXTRACORPOREAL SHOCKWAVE LITHOTRIPSY-left (Left) Diagnosis:       Kidney stone      (Kidney stone [N20.0])    Surgeons: Mt Avila MD Provider: Tyson Snyder CRNA    Anesthesia Type: general ASA Status: 3          Anesthesia Type: general    Vitals  Vitals Value Taken Time   /60 04/14/23 1800   Temp 98.1 °F (36.7 °C) 04/14/23 1738   Pulse 126 04/14/23 1800   Resp 13 04/14/23 1800   SpO2 98 % 04/14/23 1805           Post Anesthesia Care and Evaluation    Patient location during evaluation: PACU  Patient participation: complete - patient participated  Level of consciousness: awake and alert  Pain management: adequate    Airway patency: patent  Anesthetic complications: No anesthetic complications    Cardiovascular status: acceptable  Respiratory status: acceptable  Hydration status: acceptable    Comments: Blood pressure 115/60, pulse (!) 126, temperature 98.1 °F (36.7 °C), temperature source Temporal, resp. rate 13, height 179 cm (70.47\"), weight 134 kg (294 lb 5 oz), SpO2 98 %.    Pt discharged from PACU based on marques score >8      "

## 2023-04-17 LAB
QT INTERVAL: 364 MS
QTC INTERVAL: 419 MS

## 2023-05-17 NOTE — PROGRESS NOTES
UROLOGY  POD # 56    Procedure: ESWL of the left proximal ureteral calculus    Symptoms: Patient has no symptoms related to colic.  He said after the procedure he really did not have discomfort at all.  Unaware that he is passed any fragments.    Current Outpatient Medications:     Cholecalciferol (Vitamin D3) 1.25 MG (78411 UT) capsule, , Disp: , Rfl:     econazole nitrate (SPECTAZOLE) 1 % cream, 1 application Daily., Disp: , Rfl:     fluticasone (FLONASE) 50 MCG/ACT nasal spray, 2 sprays into the nostril(s) as directed by provider 2 (Two) Times a Day., Disp: 48 g, Rfl: 3    fluticasone (FLONASE) 50 MCG/ACT nasal spray, Daily., Disp: , Rfl:     hydroCHLOROthiazide (HYDRODIURIL) 12.5 MG tablet, Take 1 tablet by mouth Every Morning., Disp: , Rfl:     ketorolac (TORADOL) 10 MG tablet, Take 1 tablet by mouth Every 6 (Six) Hours As Needed for Moderate Pain., Disp: 15 tablet, Rfl: 0    lisinopril (PRINIVIL,ZESTRIL) 40 MG tablet, , Disp: , Rfl:     naloxone (NARCAN) 4 MG/0.1ML nasal spray, CALL 911. Do not prime. Spray into nostril upon signs of opioid overdose. May repeat in 2 to 3 minutes in opposite nostril if no or minimal breathing and responsiveness, then as needed (if doses are available) every 2 to 3 minutes., Disp: 2 each, Rfl: 0    ondansetron (ZOFRAN) 8 MG tablet, Daily., Disp: , Rfl:     ondansetron ODT (ZOFRAN-ODT) 4 MG disintegrating tablet, Place 1 tablet on the tongue Every 4 (Four) Hours As Needed for Nausea or Vomiting., Disp: 10 tablet, Rfl: 0    tamsulosin (FLOMAX) 0.4 MG capsule 24 hr capsule, Take 1 capsule by mouth 2 (Two) Times a Day., Disp: 60 capsule, Rfl: 0    HYDROcodone-acetaminophen (NORCO) 7.5-325 MG per tablet, Take 1 tablet by mouth Every 4 (Four) Hours As Needed for Moderate Pain. (Patient not taking: Reported on 5/30/2023), Disp: 10 tablet, Rfl: 0    oxyCODONE-acetaminophen (PERCOCET) 5-325 MG per tablet, Take 1 tablet by mouth Every 4 (Four) Hours As Needed (Pain). (Patient not taking:  "Reported on 5/30/2023), Disp: 12 tablet, Rfl: 0    Temp 97 °F (36.1 °C)   Ht 177.8 cm (70\")   Wt 135 kg (298 lb 6.4 oz)   BMI 42.82 kg/m²           Data:        ASSESSMENT AND PLAN          Problem List Items Addressed This Visit          Genitourinary and Reproductive     Left proximal ureteral calculus- Primary    Overview     Added automatically from request for surgery [id number]            After a discussion of the patient's current stone situation, I discussed conservative management to decrease risk of recurrent stones as follows:     -We recommend increasing the fluid intake so that a urine volume will be somewhere between 2-4 L/day.  To accomplish this will require a special effort to hydrate during the evening hours to produce nocturia.  This is probably the most challenging portion of hydrating to prevent stones.  It is recommended that you have two  8 ounce glasses of water between supper and bedtime.  Another 8 ounce glass should be consumed when you get up to go to the bathroom during the night.  It is explained that water hardness does not seem to predispose to stone formation and epidemiologic studies indicate the incidence of stones is lower and hard water regions then in soft water regions.  With regard to other types of fluids it is best to avoid large amounts of tea, cocoa, cola drinks, and fruit juice, which contain significant amounts of oxalate and soluble form.  With adequate hydration you should notice .........   -The urine should remain colorless.   -Urine specific gravity should measure between 1.005-1.010 on any urinalysis.   -Direct volume measurement should be greater than 2 L.    - The controversy over calcium restriction is discussed especially in patients with calcium oxalate stones.  Moderation is recommended, but not stopping it.      - Protein restriction, sodium restriction, and restriction of oxalate-containing foods is also discussed.    A handout is provided in the patient's " visit summary to remind them of this.     Return in about 1 year (around 5/30/2024) for KUB before visit.      Mt Avila MD  5/31/2023  06:53 CDT

## 2023-05-30 ENCOUNTER — HOSPITAL ENCOUNTER (OUTPATIENT)
Dept: GENERAL RADIOLOGY | Facility: HOSPITAL | Age: 43
Discharge: HOME | End: 2023-05-30
Admitting: UROLOGY

## 2023-05-30 ENCOUNTER — OFFICE VISIT (OUTPATIENT)
Dept: UROLOGY | Facility: CLINIC | Age: 43
End: 2023-05-30

## 2023-05-30 VITALS — HEIGHT: 70 IN | BODY MASS INDEX: 42.72 KG/M2 | TEMPERATURE: 97 F | WEIGHT: 298.4 LBS

## 2023-05-30 DIAGNOSIS — N20.1 LEFT URETERAL CALCULUS: ICD-10-CM

## 2023-05-30 DIAGNOSIS — N20.1 LEFT URETERAL CALCULUS: Primary | ICD-10-CM

## 2023-05-30 LAB
BILIRUB BLD-MCNC: NEGATIVE MG/DL
CLARITY, POC: CLEAR
COLOR UR: YELLOW
GLUCOSE UR STRIP-MCNC: NEGATIVE MG/DL
KETONES UR QL: NEGATIVE
LEUKOCYTE EST, POC: NEGATIVE
NITRITE UR-MCNC: NEGATIVE MG/ML
PH UR: 6 [PH] (ref 5–8)
PROT UR STRIP-MCNC: NEGATIVE MG/DL
RBC # UR STRIP: NEGATIVE /UL
SP GR UR: 1.01 (ref 1–1.03)
UROBILINOGEN UR QL: NORMAL

## 2023-05-30 PROCEDURE — 81003 URINALYSIS AUTO W/O SCOPE: CPT | Performed by: UROLOGY

## 2023-05-30 PROCEDURE — 99024 POSTOP FOLLOW-UP VISIT: CPT | Performed by: UROLOGY

## 2023-05-30 PROCEDURE — 74018 RADEX ABDOMEN 1 VIEW: CPT

## 2023-05-30 NOTE — PATIENT INSTRUCTIONS
-We recommend increasing the fluid intake so that a urine volume will be somewhere between 2-4 L/day.  To accomplish this will require a special effort to hydrate during the evening hours to produce nocturia.  This is probably the most challenging portion of hydrating to prevent stones.  It is recommended that you have two  8 ounce glasses of water between supper and bedtime.  Another 8 ounce glass should be consumed when you get up to go to the bathroom during the night.  It is explained that water hardness does not seem to predispose to stone formation and epidemiologic studies indicate the incidence of stones is lower and hard water regions then in soft water regions.  With regard to other types of fluids it is best to avoid large amounts of tea, cocoa, cola drinks, and fruit juice(excluding lemonade), which contain significant amounts of oxalate in soluble form. You can use a small amount of  Lemonade and/or soft drinks that are high in citrate (Diet Sprite, Diet 7-UP, Diet Fresca, Diet Mountain Dew, Diet Kuldeep Yellow) to increase the urine volume as well as the amount of citrate that is in the urine.      With adequate hydration you should notice .........   -The urine should remain colorless.   -Urine specific gravity should measure between 1.005-1.010 on any urinalysis.   -Direct volume measurement should be greater than 2 L.. Avoid tea, excessive amounts of caffeine and alcohol.     You should consider measuring your urine output to make at least 2.5 liters of urine per day. Be sure to hydrate when urine appears dark, concentrated or excessively colored.     Most patients do not need to restrict calcium in your diet. A moderate amount of calcium is believed to reduce calcium oxalate absorption in the intestine.     Limit the amount of non-dairy protein consumed to two palm sized servings per day.     Limit sodium intake to 2 grams each day.     Increase number of servings with fruits and vegetables to make  urine less acidic (more basic) and increase citrate in the urine.     High-oxalate foods to limit, if you eat them, are:   Spinach.   Bran flakes.   Rhubarb.   Beets.   Potato chips.   French fries.   Nuts and nut butters.  Tea  Chocolate

## 2023-09-18 ENCOUNTER — OFFICE VISIT (OUTPATIENT)
Dept: OTOLARYNGOLOGY | Facility: CLINIC | Age: 43
End: 2023-09-18
Payer: COMMERCIAL

## 2023-09-18 VITALS
WEIGHT: 296 LBS | TEMPERATURE: 98 F | DIASTOLIC BLOOD PRESSURE: 64 MMHG | SYSTOLIC BLOOD PRESSURE: 158 MMHG | HEART RATE: 71 BPM | HEIGHT: 70 IN | BODY MASS INDEX: 42.37 KG/M2

## 2023-09-18 DIAGNOSIS — K21.9 LARYNGOPHARYNGEAL REFLUX (LPR): ICD-10-CM

## 2023-09-18 DIAGNOSIS — E66.01 OBESITIES, MORBID: ICD-10-CM

## 2023-09-18 DIAGNOSIS — G47.33 OSA ON CPAP: ICD-10-CM

## 2023-09-18 DIAGNOSIS — Z99.89 OSA ON CPAP: ICD-10-CM

## 2023-09-18 DIAGNOSIS — E04.2 MULTIPLE THYROID NODULES: Primary | ICD-10-CM

## 2023-09-18 NOTE — PROGRESS NOTES
Donald Kee Jr, MD  [facility] ENT [facility] EAR NOSE & THROAT  [address], [address]  [place] [address]  Fax [phone]  Phone [phone]      Visit Type: FOLLOW UP   Chief Complaint   Patient presents with    1 year thyroid follow up         HPI  Accompanied by No one  He presents for a follow up evaluation. No change in neck . Here for U/S.  Following advice on antacids.   Wearing CPAP  Improved GERD.     Past Medical History:   Diagnosis Date    Bell's palsy     Hypertension     Kidney stone     Pancreatic cyst     Sleep apnea        Past Surgical History:   Procedure Laterality Date    EXTRACORPOREAL SHOCK WAVE LITHOTRIPSY (ESWL) Left 4/14/2023    Procedure: EXTRACORPOREAL SHOCKWAVE LITHOTRIPSY-left;  Surgeon: Mt Avila MD;  Location: [facility];  Service: Urology;  Laterality: Left;       Family History: His family history includes Diabetes type II in his father and maternal grandmother; Heart disease in his father.     Social History: He  reports that he has never smoked. He has never used smokeless tobacco. He reports that he does not drink alcohol and does not use drugs.    Home Medications:  Vitamin D3, econazole nitrate, fluticasone, hydroCHLOROthiazide, ketorolac, lisinopril, naloxone, ondansetron, ondansetron ODT, and tamsulosin    Allergies:  He is allergic to keflex [cephalexin] and augmentin [amoxicillin-pot clavulanate].       Vital Signs:   Temp:  [98 °F (36.7 °C)] 98 °F (36.7 °C)  Heart Rate:  [71] 71  BP: (158)/(64) 158/64  ENT Physical Exam  Constitutional  Appearance: patient appears well-developed and well-nourished,  Communication/Voice: communication appropriate for developmental age; vocal quality normal;  Constitutional comments: obese  Head and Face  Appearance: head appears normal, face appears normal and face appears atraumatic;  Palpation: facial palpation normal;  Salivary: glands normal;  Ear  Hearing:  intact;  Auricles: bilateral auricles normal;  External Mastoids: right external mastoid normal; left external mastoid normal;  Ear Canals: bilateral ear canals normal;  Tympanic Membranes: bilateral tympanic membranes normal;  Nose  External Nose: nares patent bilaterally; external nose normal; nasal deformity not visible;  Internal Nose: bilateral intranasal mucosa edematous and erythematous; nasal septal deviation present; deviation is to the left, septal deviation is mild; Septum comments: Left to right low and mid moderate to severe   bilateral inferior turbinates edematous and erythematous;  Oral Cavity/Oropharynx  Lips: normal;  Teeth: normal;  Gums: gingiva normal;  Tongue: tongue macroglossia present; Reese II position; Oral Tongue comments: Mod  Oral mucosa: normal;  Hard palate: normal;  Soft palate: normal;  Tonsils: normal;  Base of Tongue: normal;  Posterior pharyngeal wall: normal;  Neck  Neck: neck normal;  Respiratory  Inspection: breathing unlabored; normal breathing rate;  Cardiovascular  Inspection: extremities are warm and well perfused; no peripheral edema present;  Neurovestibular  Mental Status: alert and oriented;  Psychiatric: mood normal; affect is appropriate;       Result Review    RESULTS REVIEW    I have reviewed the patients old records in the chart.   I have reviewed the patients old records in the chart.  The following results/records were reviewed:  I reviewed the patient's new imaging results and agree with the interpretation.  Thyroid ultrasound showed stable nodules.   US Head Neck Soft Tissue (09/18/2023 08:48)     Assessment & Plan    Diagnoses and all orders for this visit:    1. Multiple thyroid nodules (Primary)  Comments:  Stable  Orders:  -     US Head Neck Soft Tissue; Future    2. Laryngopharyngeal reflux (LPR)  Comments:  Improved    3. ANGIE on CPAP  Comments:  Compliant    4. Obesities, morbid  Comments:  Stable       Conservative management.  Patient appears to be  in stable condition.  He has stable reflux.  He is compliant with his CPAP.  Thyroid nodules appear stable.  I have interpreted his thyroid imaging as TI-RADS 3.  I will await final radiologic confirmation.  I do not feel the patient warrants biopsy at this time.  I will continue to follow.  If the patient has a normal or unchanged thyroid ultrasound in 1 year, I would recommend he get thyroid ultrasound every 2 years.a    My Chart:  Patient is using My Chart    Patient understand(s) and agree(s) with the treatment plan as described.    Return in about 1 year (around 9/18/2024) for Recheck thyroid U/S.      Electronically signed by Donald Kee Jr, MD 09/18/23 9:21 AM CDT.

## 2023-09-18 NOTE — PATIENT INSTRUCTIONS
"THE PROBLEM OF ACID REFLUX    In BOTH children and adults, irritating acids may leak from the stomach and come up into the esophagus and throat. This can occur at any time, but occurs more commonly when lying down. When the acid touches the lining of the esophagus, there is irritation and muscle spasm, which can be felt up into the throat. Usually this is felt as \"heartburn\". When scarring of the esophagus occurs, the esophagus becomes less sensitive and the symptoms may only be in the throat.     Some of the symptoms that can occur with acid reflux into the throat include coughing, soreness, burning, hoarseness, throat clearing, excessive mucous, bad taste in the mouth, bad breath, a sensation of a lump in the throat, wheezing, post-nasal drip, choking spells, bleeding and nausea. In a small percentage of people, more serious problems may result, such as pneumonia, ulcers in the larynx (voice box), reduced mobility of the vocal cords and a pouch in the upper esophagus (diverticulum). In children, the symptoms may be different and include persistent vomiting, bleeding from the esophagus, respiratory problems, pneumonia, asthma, choking spells, swallowing problems and anemia. In some cases, unexplained fussiness and crying is due to reflux.     The following instructions are designed to help neutralize the stomach acid, reduce the production of the acid, promote emptying of the acid from the stomach into the intestines and prevent acid from coming up into the esophagus. You should adopt enough of these changes to alleviate your symptoms. If carrying out these suggestions produces no change, it is imperative that you return so that we can discuss further the problem. More testing may be required, as might medication. It is important to realize that the esophagus takes time to heal, so you should not expect results immediately.    Diet  Most often, the throat symptoms above are due to dietary abuses. Certain foods are " known to cause irritation, because they are acids themselves or cause excess production of stomach acid. These should be avoided, if possible. The following is a partial list of foods recognized as troublesome: coffee (even decaffeinated), tea chocolate, cola beverages, citrus beverages (such as 7-Up), caffeine containing beverages, alcohol, citrus fruits and juices, highly spiced foods, fatty foods, candies, nuts, mints, milk and milk products. Some of the worst offenders for promoting stomach acid are milk and beer.     Hard candies, gum, breath fresheners, throat lozenges, cough drops, mouthwashes, gargles, may actually irritate the throat directly (many cough drops and lozenges contain irritants such as oil of eucalyptus and menthol), and can also stimulate acid production directly.     Large amounts of liquid in the diet tend to promote reflux, because liquids tend to come back up more easily than solids.     Meals should be bland and consist of real food, trying to avoid recreational food. Multiple small meals, rather than one or two large meals helps prevent reflux by not stretching the stomach and increasing the time needed for digestion, as well increasing the amount of acid needed to digest the meal. If you are overweight, losing weight is tremendously helpful.     YOU SHOULD NOT EAT FOR AT LEAST TWO HOURS BEFORE RETIRING AND YOU SHOULD REMAIN SITTING OR STANDING FOR AT LEAST 45 MINUTES AFTER EACH MEAL. This promotes passage of food from the stomach into the intestine.    Posture  Body weight is a significant factor in promoting reflux. Losing weight is beneficial. Pregnancy will markedly increase the symptoms of heartburn and throat pain. You should avoid clothing that fits tightly across the mid-section, as this promotes squeezing of the abdominal contents upward. It is helpful to practice abdominal or diaphragmatic breathing, using the stomach to push out each breath rather than chest expansion. Avoid  slumping while sitting, and avoid stooping or straining.     For many, the reflux occurs at night while sleeping. This is the time acid production is the greatest and you are lying down, a position that promotes reflux, thus setting up the irritation that occurs the next morning. One of the most important things you can do is to elevate the head of the bed, in an effort to use gravity to keep the acid in the stomach. This is accomplished by placed blocks or bricks beneath the legs at the head of the bed, raising the head 6-10 inches. Do not make the head so high that you slide down. Pillows are not effective because they cause the body to curl, increasing abdominal pressure and it is difficult to remain upright on them. Additionally, pillows promote sleeping on the back, but it is far more desirable to sleep on the right side or on the stomach, as this allows gas to escape, while preventing the escape of acid material. Children and infants, who are refluxing, should sleep on their stomachs.  Exercise  Regular exercise is extremely beneficial in promoting good digestion and regularity. The abdominal muscles are toned, which aids in controlling acid problems. However, it is recommended that you not participate in vigorous activity immediately after eating. This causes pressure on an already full stomach, forcing acid up into the esophagus. Regular exercise is encouraged, prior to meals, or two hours after meals.  Antacids  Antacids should be used regularly, as they neutralize excess acid. Gelusil II, Mylanta II, Tums and Rolaids are popular brands. Gaviscon is not an antacid, but floats on top of the stomach acid, preventing esophageal irritation. Care should be used in administering large doses of antacids, especially in children. Many antacid preparations contain magnesium, which promotes frequent bowel movements, while antacids that contain aluminum can be constipating. Tums have a high calcium content that can be  used to some advantage in older women with reflux.     Antacid tablets are convenient, but the liquid form tends to work much more quickly. Also remember to check with your physician about interference with other medications. Antacids can slow the absorption of digitalis, Indocin, tetracyclines, fluorides and isoniazid from the intestines. Many medications require the presence of stomach acid to be absorbed.     Initially, antacids should be used 30 minutes after each meal, 2 hours after each meal and at bedtime. This maximizes the neutralization of the stomach acid. Antacids can be used more frequently if symptoms persist, but such extensive use needs to be reviewed with your physician.  Prescription Medications  If the above recommendations are not effectively resolving the symptoms, medications may be prescribed. These are usually in the form of acid production blockers, such as Tagamet, Zantac, Pepcid, or Axid or acid pump inhibitors like Prevacid, Nexium, Protonix or Prilosec. These drugs help stop acid production in the stomach. Medications such as Reglan can be used to promote stomach emptying.  Medications That Promote Reflux  Certain medications can promote acid reflux; either by relaxing the sphincter muscle that helps keeps stomach acid down, or increasing the acid production. These include progesterone (Provera, Ortho Novum, Ovral, other birth control pills), theophylline (Ismael-Dur, etc.), anticholinergic (Donnatal, Scopolamine, Probanthine, Bentil, others), beta blockers (Inderal, Tenormin and Corgard), alpha blockers (Dibenzyline), dopamine, calcium channel blockers (Procardia, Cardizem, Isotin, Calan), aspirin, non-steroidal anti-inflammatory drugs (Motrin, Advil, Nuprin, Nalfon, Rufen, Indocin, Feldene, Clinoril and Tolectin). Vitamin C is an acid and can promote reflux. This is only a partial list and before stopping any prescription medication, you should check with your physician.    Goal  The  goal is to reduce the symptoms with the least number of lifestyle changes. A combination of the above suggestions is frequently prescribed to return you to normal as quickly as possible. However, this problem did not develop overnight and will not disappear rapidly. Therefore, developing a regimen will aid in controlling symptoms and keep you symptom free for a long period of time. Other alternatives exist, should these suggestions fail, and can be discussed with your physician.     To Summarize:  Watch your diet, avoid foods that cause acid reflux  Lose weight  Avoid tight fitting clothes  Adjust your posture, raise the head of the bed  Exercise regularly  Use antacids  Use prescription medication as directed  Avoid medications that promote reflux  Avoid behavior and eating habits that promote reflux of stomach acid     You are welcome to do a Google search on the topic of reflux and reflux diets. The internet has many useful resources.     Please remember, your success in controlling this condition depends on many factors including diet, exercise, medications and follow up. All are important and must be utilized to achieve success.      ANTACID USE:  Use antacids 30 mins after every meal, 2 hours after every meal and at Bedtime  Use Gaviscon Extra (best), Tums, Rolaids, etc  Over the counter  Use 2 tsp or 2 tabs as the dose  Remember that some of these products contain Calcium or Aluminum. If you have dietary or medical issues, please inform physician     Thyroid Ultrasound one year    Call for problems     CONTACT INFORMATION:  The main office phone number is [phone]. For emergencies after hours and on weekends, this number will convert over to our answering service and the on call provider will answer. Please try to keep non emergent phone calls/ questions to office hours 9am-5pm Monday through Friday.     [facility]  As an alternative, you can sign up and use the Epic MyChart system for more direct and  quicker access for non emergent questions/ problems.  [facility] [employer] allows you to send messages to your doctor, view your test results, renew your prescriptions, schedule appointments, and more. To sign up, go to [employer] and click on the Sign Up Now link in the New User? box. Enter your [employer] Activation Code exactly as it appears below along with the last four digits of your Social Security Number and your Date of Birth () to complete the sign-up process. If you do not sign up before the expiration date, you must request a new code.    [employer] Activation Code: Activation code not generated  Current [employer] Status: Active    If you have questions, you can email [email or web address] or call [phone] to talk to our [employer] staff. Remember, [employer] is NOT to be used for urgent needs. For medical emergencies, dial 911.

## 2023-09-25 ENCOUNTER — TELEPHONE (OUTPATIENT)
Dept: UROLOGY | Facility: CLINIC | Age: 43
End: 2023-09-25

## 2023-09-25 DIAGNOSIS — N20.1 LEFT URETERAL CALCULUS: Primary | ICD-10-CM

## 2023-09-25 NOTE — PROGRESS NOTES
Subjective    Mr. Spencer is 43 y.o. male    Chief Complaint: new finding right ureteral stone     History of Present Illness    23-year-old male established patient in with new complaint of right flank pain for which patient presented to outlying facility where he was found to have a 5 mm right proximal ureteral obstructing stone causing mild hydronephrosis.  Patient with a known history of kidney stones last treated with ESWL for a 7 mm left proximal ureteral stone back in April of this year with Dr. Avila.  At present patient reports the pain has actually resolved however based on KUB today the stone remains.  Currently taking tamsulosin 0.4 mg daily since recurrence of stone.  Not requiring any pain medicine at present.      The following portions of the patient's history were reviewed and updated as appropriate: allergies, current medications, past family history, past medical history, past social history, past surgical history and problem list.    Review of Systems   Constitutional:  Negative for chills and fever.   Gastrointestinal:  Positive for nausea. Negative for abdominal pain, anal bleeding and blood in stool.   Genitourinary:  Positive for flank pain and frequency. Negative for dysuria and hematuria.   Musculoskeletal:  Positive for back pain.       Current Outpatient Medications:     Cholecalciferol (Vitamin D3) 1.25 MG (01878 UT) capsule, , Disp: , Rfl:     econazole nitrate (SPECTAZOLE) 1 % cream, 1 application  Daily., Disp: , Rfl:     fluticasone (FLONASE) 50 MCG/ACT nasal spray, 2 sprays into the nostril(s) as directed by provider 2 (Two) Times a Day., Disp: 48 g, Rfl: 3    hydroCHLOROthiazide (HYDRODIURIL) 12.5 MG tablet, Take 1 tablet by mouth Every Morning., Disp: , Rfl:     ketorolac (TORADOL) 10 MG tablet, Take 1 tablet by mouth Every 6 (Six) Hours As Needed for Moderate Pain., Disp: 15 tablet, Rfl: 0    lisinopril (PRINIVIL,ZESTRIL) 40 MG tablet, , Disp: , Rfl:     naloxone (NARCAN) 4  "MG/0.1ML nasal spray, CALL 911. Do not prime. Spray into nostril upon signs of opioid overdose. May repeat in 2 to 3 minutes in opposite nostril if no or minimal breathing and responsiveness, then as needed (if doses are available) every 2 to 3 minutes., Disp: 2 each, Rfl: 0    ondansetron (ZOFRAN) 8 MG tablet, Daily., Disp: , Rfl:     ondansetron ODT (ZOFRAN-ODT) 4 MG disintegrating tablet, Place 1 tablet on the tongue Every 4 (Four) Hours As Needed for Nausea or Vomiting., Disp: 10 tablet, Rfl: 0    tamsulosin (FLOMAX) 0.4 MG capsule 24 hr capsule, Take 1 capsule by mouth 2 (Two) Times a Day., Disp: 60 capsule, Rfl: 0    Past Medical History:   Diagnosis Date    Bell's palsy     Hypertension     Kidney stone     Pancreatic cyst     Sleep apnea        Past Surgical History:   Procedure Laterality Date    EXTRACORPOREAL SHOCK WAVE LITHOTRIPSY (ESWL) Left 4/14/2023    Procedure: EXTRACORPOREAL SHOCKWAVE LITHOTRIPSY-left;  Surgeon: Mt Avila MD;  Location: [facility];  Service: Urology;  Laterality: Left;       Social History     Socioeconomic History    Marital status:    Tobacco Use    Smoking status: Never    Smokeless tobacco: Never   Vaping Use    Vaping Use: Never used   Substance and Sexual Activity    Alcohol use: Never    Drug use: Never       Family History   Problem Relation Age of Onset    Diabetes type II Father     Heart disease Father     Diabetes type II Maternal Grandmother        Objective    Temp 97.3 °F (36.3 °C)   Ht 177.8 cm (70\")   Wt (!) 137 kg (303 lb)   BMI 43.48 kg/m²     Physical Exam  Constitutional:       Appearance: Normal appearance.   Abdominal:      Tenderness: There is no right CVA tenderness or left CVA tenderness.   Skin:     General: Skin is warm and dry.   Neurological:      Mental Status: He is alert and oriented to person, place, and time.   Psychiatric:         Mood and Affect: Mood normal.         Behavior: Behavior normal.           Results for orders placed " or performed in visit on 09/26/23   POC Urinalysis Dipstick, Multipro    Specimen: Urine   Result Value Ref Range    Color Yellow Yellow, Straw, Dark Yellow, Vesna    Clarity, UA Clear Clear    Glucose, UA Negative Negative mg/dL    Bilirubin Negative Negative    Ketones, UA Negative Negative    Specific Gravity  1.010 1.005 - 1.030    Blood, UA Small (A) Negative    pH, Urine 6.5 5.0 - 8.0    Protein, POC Negative Negative mg/dL    Urobilinogen, UA 0.2 E.U./dL Normal, 0.2 E.U./dL    Nitrite, UA Negative Negative    Leukocytes Negative Negative   KUB independent review    A KUB is available for me to review today.  The image is inspected for a bowel gas pattern and the general bone structure of the spine and pelvis. The kidneys are then inspected closely.  Renal outline is noted if identifiable. The kidney, collecting system, and anticipated path of the ureter are examined for calcifications including those in the true pelvis.  This film reveals:    On the right there is a single proximal ureteral stone measuring 5 mm.    On the left there are multiple renal stones.   Independent review of a CT scan of abdomen and pelvis without contrast  The CT scan of the abdomen/pelvis done without contrast is available for me to review.  Treatment recommendations require an independent review.  First I scanned the liver, spleen, and bowel pattern.  The retroperitoneum including the major vessels and lymphatic packages are briefly reviewed.  This film has been reviewed by the radiologist to determine any nonurologic abnormalities that are present.  The kidneys are closely inspected for size, symmetry, contour, parenchymal thickness, perinephric reaction, presence of calcifications, and intrarenal dilation of the collecting system.  The ureters are inspected for their course, caliber, and any calcifications.  The bladder is inspected for its thickness, size, and presence of any calcifications.  This scan shows 5 mm right proximal  ureteral stone causing mild hydronephrosis.  2 additional stones within the right kidney nonobstructing as well as 3 to 4 stones within the left kidney.  Assessment and Plan    Diagnoses and all orders for this visit:    1. Left ureteral calculus (Primary)  -     POC Urinalysis Dipstick, Multipro    2. Right ureteral stone  -     XR Abdomen KUB; Future  -     US Renal Bilateral; Future  -     ketorolac (TORADOL) 10 MG tablet; Take 1 tablet by mouth Every 6 (Six) Hours As Needed for Moderate Pain.  Dispense: 15 tablet; Refill: 0      New onset right flank pain approximately 1 week ago-was found to have a 5 mm right proximal ureteral obstructing stone on CT through living will radiology    KUB today confirming the stone remains within the right proximal ureter    Patient reports is fixing to be leaving on a cruise as of this Saturday has opted to hold off on any surgical intervention at this point and will try expulsive therapy    Patient currently states he has enough tamsulosin at home will continue taking 1-2 times daily have encouraged increased fluid intake and we will go ahead and send in ketorolac as needed for pain as patient states he already has some hydrocodone at home if needed    We will have patient follow-up in 2 weeks with repeat KUB and a renal ultrasound prior

## 2023-09-25 NOTE — TELEPHONE ENCOUNTER
Called Patient to remind them to get KUB prior to appointment.  Spoke with Patient.  If patient calls back it is ok for the HUB to tell the pt the message.

## 2023-09-26 ENCOUNTER — HOSPITAL ENCOUNTER (OUTPATIENT)
Dept: GENERAL RADIOLOGY | Facility: HOSPITAL | Age: 43
Discharge: HOME | End: 2023-09-26
Payer: COMMERCIAL

## 2023-09-26 ENCOUNTER — OFFICE VISIT (OUTPATIENT)
Dept: UROLOGY | Facility: CLINIC | Age: 43
End: 2023-09-26
Payer: COMMERCIAL

## 2023-09-26 VITALS — BODY MASS INDEX: 43.38 KG/M2 | TEMPERATURE: 97.3 F | WEIGHT: 303 LBS | HEIGHT: 70 IN

## 2023-09-26 DIAGNOSIS — N20.1 LEFT URETERAL CALCULUS: ICD-10-CM

## 2023-09-26 DIAGNOSIS — N20.1 LEFT URETERAL CALCULUS: Primary | ICD-10-CM

## 2023-09-26 DIAGNOSIS — N20.1 RIGHT URETERAL STONE: ICD-10-CM

## 2023-09-26 PROCEDURE — 81001 URINALYSIS AUTO W/SCOPE: CPT

## 2023-09-26 PROCEDURE — 74018 RADEX ABDOMEN 1 VIEW: CPT

## 2023-09-26 PROCEDURE — 99214 OFFICE O/P EST MOD 30 MIN: CPT

## 2023-09-26 RX ORDER — KETOROLAC TROMETHAMINE 10 MG/1
10 TABLET, FILM COATED ORAL EVERY 6 HOURS PRN
Qty: 15 TABLET | Refills: 0 | Status: SHIPPED | OUTPATIENT
Start: 2023-09-26

## 2023-09-28 NOTE — PROGRESS NOTES
Subjective    Mr. Sepncer is 43 y.o. male    Chief Complaint: Kidney stone    History of Present Illness    43-year-old male established patient in for 2-week follow-up regarding 5 mm right proximal ureteral obstructing stone causing mild hydronephrosis for which patient had opted to try expulsive therapy.  Repeat KUB and renal ultrasound today showing that the 5 mm stone remains however now appears to be within the distal ureter and is no longer causing obstruction.  Patient denies any flank pain or hematuria.  Has remained on tamsulosin 0.4 mg daily with increased fluid intake.     history of kidney stones last treated with ESWL for a 7 mm left proximal ureteral stone back in April of this year with Dr. Avila.      The following portions of the patient's history were reviewed and updated as appropriate: allergies, current medications, past family history, past medical history, past social history, past surgical history and problem list.    Review of Systems   Constitutional:  Negative for chills and fever.   Gastrointestinal:  Negative for abdominal pain, anal bleeding, blood in stool, nausea and vomiting.   Genitourinary:  Positive for dysuria. Negative for decreased urine volume, difficulty urinating, flank pain, frequency, hematuria and urgency.         Current Outpatient Medications:     Cholecalciferol (Vitamin D3) 1.25 MG (24494 UT) capsule, , Disp: , Rfl:     econazole nitrate (SPECTAZOLE) 1 % cream, 1 application  Daily., Disp: , Rfl:     fluticasone (FLONASE) 50 MCG/ACT nasal spray, 2 sprays into the nostril(s) as directed by provider 2 (Two) Times a Day., Disp: 48 g, Rfl: 3    hydroCHLOROthiazide (HYDRODIURIL) 12.5 MG tablet, Take 1 tablet by mouth Every Morning., Disp: , Rfl:     ketorolac (TORADOL) 10 MG tablet, Take 1 tablet by mouth Every 6 (Six) Hours As Needed for Moderate Pain., Disp: 15 tablet, Rfl: 0    lisinopril (PRINIVIL,ZESTRIL) 40 MG tablet, , Disp: , Rfl:     naloxone (NARCAN) 4 MG/0.1ML  "nasal spray, CALL 911. Do not prime. Spray into nostril upon signs of opioid overdose. May repeat in 2 to 3 minutes in opposite nostril if no or minimal breathing and responsiveness, then as needed (if doses are available) every 2 to 3 minutes., Disp: 2 each, Rfl: 0    ondansetron (ZOFRAN) 8 MG tablet, Daily., Disp: , Rfl:     ondansetron ODT (ZOFRAN-ODT) 4 MG disintegrating tablet, Place 1 tablet on the tongue Every 4 (Four) Hours As Needed for Nausea or Vomiting., Disp: 10 tablet, Rfl: 0    tamsulosin (FLOMAX) 0.4 MG capsule 24 hr capsule, Take 1 capsule by mouth 2 (Two) Times a Day., Disp: 60 capsule, Rfl: 0    Past Medical History:   Diagnosis Date    Bell's palsy     Hypertension     Kidney stone     Pancreatic cyst     Sleep apnea        Past Surgical History:   Procedure Laterality Date    EXTRACORPOREAL SHOCK WAVE LITHOTRIPSY (ESWL) Left 4/14/2023    Procedure: EXTRACORPOREAL SHOCKWAVE LITHOTRIPSY-left;  Surgeon: Mt Avila MD;  Location: [facility];  Service: Urology;  Laterality: Left;       Social History     Socioeconomic History    Marital status:    Tobacco Use    Smoking status: Never    Smokeless tobacco: Never   Vaping Use    Vaping Use: Never used   Substance and Sexual Activity    Alcohol use: Never    Drug use: Never       Family History   Problem Relation Age of Onset    Diabetes type II Father     Heart disease Father     Diabetes type II Maternal Grandmother        Objective    Temp 97.4 øF (36.3 øC)   Ht 177.8 cm (70\")   Wt (!) 136 kg (300 lb 3.2 oz)   BMI 43.07 kg/mý     Physical Exam  Constitutional:       Appearance: Normal appearance.   Abdominal:      Tenderness: There is no right CVA tenderness or left CVA tenderness.   Skin:     General: Skin is warm and dry.   Neurological:      Mental Status: He is alert and oriented to person, place, and time.   Psychiatric:         Mood and Affect: Mood normal.         Behavior: Behavior normal.             Results for orders placed " or performed in visit on 10/09/23   POC Urinalysis Dipstick, Multipro    Specimen: Urine   Result Value Ref Range    Color Yellow Yellow, Straw, Dark Yellow, Vesna    Clarity, UA Clear Clear    Glucose, UA Negative Negative mg/dL    Bilirubin Negative Negative    Ketones, UA Negative Negative    Specific Gravity  1.020 1.005 - 1.030    Blood, UA Moderate (A) Negative    pH, Urine 6.5 5.0 - 8.0    Protein, POC Negative Negative mg/dL    Urobilinogen, UA 0.2 E.U./dL Normal, 0.2 E.U./dL    Nitrite, UA Negative Negative    Leukocytes Negative Negative   KUB independent review    A KUB is available for me to review today.  The image is inspected for a bowel gas pattern and the general bone structure of the spine and pelvis. The kidneys are then inspected closely.  Renal outline is noted if identifiable. The kidney, collecting system, and anticipated path of the ureter are examined for calcifications including those in the true pelvis.  This film reveals:    On the right there is a single distal ureteral stone measuring 5 mm.  As well as 2 other stones seen within the right upper pole    On the left there are multiple renal stones.  Largest measuring 8 mm within the left lower pole.    Independent renal ultrasound review  The renal ultrasound is available for me to review.  Treatment recommendations require an independent review.  This film has been reviewed by the radiologist to determine any non urologic abnormalities that are presents.  I inspected the kidneys for size, symmetry, contour, parenchymal thickness, perinephric reaction, presence of calcifications, and intrarenal dilation of the collecting system.  This US shows previous ureteral stone appears to now be within the right distal ureter 5 to 7 mm.  No hydronephrosis seen.  Patient with multiple bilateral renal stones.  Assessment and Plan    Diagnoses and all orders for this visit:    1. Left ureteral calculus (Primary)  -     POC Urinalysis Dipstick,  Multipro    2. Right ureteral stone  -     XR Abdomen KUB; Future      KUB and renal ultrasound today both showing that the 5 mm stone remains however is now within the distal ureter and does not currently appear to be obstructing as there is no swelling of the kidney seen    Given that patient remains pain-free and the stone has been moving patient would like to continue to try expulsive therapy as patient does want to avoid a stent    We will have patient return in 3 weeks with KUB prior have encouraged patient to increase tamsulosin if able to twice daily dosing and continue increase fluid intake

## 2023-10-06 ENCOUNTER — TELEPHONE (OUTPATIENT)
Dept: UROLOGY | Facility: CLINIC | Age: 43
End: 2023-10-06
Payer: COMMERCIAL

## 2023-10-06 NOTE — TELEPHONE ENCOUNTER
Called Patient to remind them to get KUB prior to appointment.  LVM with Patient.  If patient calls back it is ok for the HUB to tell the pt the message.

## 2023-10-09 ENCOUNTER — OFFICE VISIT (OUTPATIENT)
Dept: UROLOGY | Facility: CLINIC | Age: 43
End: 2023-10-09
Payer: COMMERCIAL

## 2023-10-09 ENCOUNTER — HOSPITAL ENCOUNTER (OUTPATIENT)
Dept: GENERAL RADIOLOGY | Facility: HOSPITAL | Age: 43
Discharge: HOME | End: 2023-10-09
Payer: COMMERCIAL

## 2023-10-09 ENCOUNTER — HOSPITAL ENCOUNTER (OUTPATIENT)
Dept: ULTRASOUND IMAGING | Facility: HOSPITAL | Age: 43
Discharge: HOME | End: 2023-10-09
Payer: COMMERCIAL

## 2023-10-09 VITALS — WEIGHT: 300.2 LBS | TEMPERATURE: 97.4 F | BODY MASS INDEX: 42.98 KG/M2 | HEIGHT: 70 IN

## 2023-10-09 DIAGNOSIS — N20.1 LEFT URETERAL CALCULUS: Primary | ICD-10-CM

## 2023-10-09 DIAGNOSIS — N20.1 RIGHT URETERAL STONE: ICD-10-CM

## 2023-10-09 LAB
BILIRUB BLD-MCNC: NEGATIVE MG/DL
CLARITY, POC: CLEAR
COLOR UR: YELLOW
GLUCOSE UR STRIP-MCNC: NEGATIVE MG/DL
KETONES UR QL: NEGATIVE
LEUKOCYTE EST, POC: NEGATIVE
NITRITE UR-MCNC: NEGATIVE MG/ML
PH UR: 6.5 [PH] (ref 5–8)
PROT UR STRIP-MCNC: NEGATIVE MG/DL
RBC # UR STRIP: ABNORMAL /UL
SP GR UR: 1.02 (ref 1–1.03)
UROBILINOGEN UR QL: ABNORMAL

## 2023-10-09 PROCEDURE — 74018 RADEX ABDOMEN 1 VIEW: CPT

## 2023-10-09 PROCEDURE — 76775 US EXAM ABDO BACK WALL LIM: CPT

## 2023-10-09 PROCEDURE — 81001 URINALYSIS AUTO W/SCOPE: CPT

## 2023-10-09 PROCEDURE — 99214 OFFICE O/P EST MOD 30 MIN: CPT

## 2023-10-10 ENCOUNTER — TELEPHONE (OUTPATIENT)
Dept: UROLOGY | Facility: CLINIC | Age: 43
End: 2023-10-10
Payer: COMMERCIAL

## 2023-10-10 ENCOUNTER — PROCEDURE VISIT (OUTPATIENT)
Dept: UROLOGY | Facility: CLINIC | Age: 43
End: 2023-10-10
Payer: COMMERCIAL

## 2023-10-10 DIAGNOSIS — N20.0 KIDNEY STONE: Primary | ICD-10-CM

## 2023-10-10 PROCEDURE — 82360 CALCULUS ASSAY QUANT: CPT

## 2023-10-10 PROCEDURE — 88300 SURGICAL PATH GROSS: CPT

## 2023-10-10 NOTE — TELEPHONE ENCOUNTER
Pt. Called to let us know he passed his stone this am, instructed pt. To bring it to our office so we can send for pathology, he v/u. Pt. Also wanted to know if he can stop Flomax and when he needs to follow up with [facility]. Spoke to SUNSHINE SYLVESTER, wants pt. To follow up in 6 weeks with renal US prior, no KUB needed and pt. Can stop Flomax. Called pt. To let him know, he v/u.

## 2023-10-11 LAB
LAB AP CASE REPORT: NORMAL
Lab: NORMAL
PATH REPORT.FINAL DX SPEC: NORMAL
PATH REPORT.GROSS SPEC: NORMAL

## 2023-11-03 ENCOUNTER — TELEPHONE (OUTPATIENT)
Dept: UROLOGY | Facility: CLINIC | Age: 43
End: 2023-11-03
Payer: COMMERCIAL

## 2023-11-03 NOTE — TELEPHONE ENCOUNTER
Left patient a voicemail to let him know about his stone pathology Calcium oxalate stone formation.  Recommend following calcium oxalate diet.  Lemonade therapy.     HUB CAN READ

## 2023-11-03 NOTE — TELEPHONE ENCOUNTER
----- Message from NGA Ridley sent at 11/2/2023 12:29 PM CDT -----  Calcium oxalate stone formation.  Recommend following calcium oxalate diet.  Lemonade therapy.

## 2023-11-07 NOTE — PROGRESS NOTES
Subjective    Mr. Spencer is 43 y.o. male    Chief Complaint: Follow-up 5 mm right ureteral stone    History of Present Illness    43-year-old male established patient in for follow-up regarding 5 mm right proximal ureteral obstructing stone causing mild hydronephrosis for which patient opted to try expulsive therapy.  Patient was able to pass the stone and stone was sent for tissue pathology revealing calcium oxalate stone disease.  Patient returns in office with repeat renal ultrasound revealing hydronephrosis has resolved and bilateral renal stones remain.  Currently denies any further flank pain or hematuria.       history of kidney stones last treated with ESWL for a 7 mm left proximal ureteral stone back in April of this year with Dr. Avila.    The following portions of the patient's history were reviewed and updated as appropriate: allergies, current medications, past family history, past medical history, past social history, past surgical history and problem list.    Review of Systems   Constitutional:  Negative for chills and fever.   Gastrointestinal:  Negative for abdominal pain, anal bleeding and blood in stool.   Genitourinary:  Negative for dysuria, flank pain and hematuria.         Current Outpatient Medications:     Cholecalciferol (Vitamin D3) 1.25 MG (54236 UT) capsule, , Disp: , Rfl:     econazole nitrate (SPECTAZOLE) 1 % cream, 1 application  Daily., Disp: , Rfl:     fluticasone (FLONASE) 50 MCG/ACT nasal spray, 2 sprays into the nostril(s) as directed by provider 2 (Two) Times a Day., Disp: 48 g, Rfl: 3    hydroCHLOROthiazide (HYDRODIURIL) 12.5 MG tablet, Take 1 tablet by mouth Every Morning., Disp: , Rfl:     ketorolac (TORADOL) 10 MG tablet, Take 1 tablet by mouth Every 6 (Six) Hours As Needed for Moderate Pain., Disp: 15 tablet, Rfl: 0    lisinopril (PRINIVIL,ZESTRIL) 40 MG tablet, , Disp: , Rfl:     naloxone (NARCAN) 4 MG/0.1ML nasal spray, CALL 911. Do not prime. Spray into nostril upon  "signs of opioid overdose. May repeat in 2 to 3 minutes in opposite nostril if no or minimal breathing and responsiveness, then as needed (if doses are available) every 2 to 3 minutes., Disp: 2 each, Rfl: 0    ondansetron (ZOFRAN) 8 MG tablet, Daily., Disp: , Rfl:     ondansetron ODT (ZOFRAN-ODT) 4 MG disintegrating tablet, Place 1 tablet on the tongue Every 4 (Four) Hours As Needed for Nausea or Vomiting., Disp: 10 tablet, Rfl: 0    tamsulosin (FLOMAX) 0.4 MG capsule 24 hr capsule, Take 1 capsule by mouth 2 (Two) Times a Day. (Patient not taking: Reported on 11/16/2023), Disp: 60 capsule, Rfl: 0    Past Medical History:   Diagnosis Date    Bell's palsy     Hypertension     Kidney stone     Pancreatic cyst     Sleep apnea        Past Surgical History:   Procedure Laterality Date    EXTRACORPOREAL SHOCK WAVE LITHOTRIPSY (ESWL) Left 4/14/2023    Procedure: EXTRACORPOREAL SHOCKWAVE LITHOTRIPSY-left;  Surgeon: Mt Avila MD;  Location: [facility];  Service: Urology;  Laterality: Left;       Social History     Socioeconomic History    Marital status:    Tobacco Use    Smoking status: Never    Smokeless tobacco: Never   Vaping Use    Vaping Use: Never used   Substance and Sexual Activity    Alcohol use: Never    Drug use: Never       Family History   Problem Relation Age of Onset    Diabetes type II Father     Heart disease Father     Diabetes type II Maternal Grandmother        Objective    Temp 98.1 °F (36.7 °C)   Ht 177.8 cm (70\")   Wt 136 kg (299 lb 12.8 oz)   BMI 43.02 kg/m²     Physical Exam  Constitutional:       Appearance: Normal appearance.   Abdominal:      Tenderness: There is no right CVA tenderness or left CVA tenderness.   Skin:     General: Skin is warm and dry.   Neurological:      Mental Status: He is alert and oriented to person, place, and time.   Psychiatric:         Mood and Affect: Mood normal.         Behavior: Behavior normal.             Results for orders placed or performed in " visit on 11/16/23   POC Urinalysis Dipstick, Multipro    Specimen: Urine   Result Value Ref Range    Color Yellow Yellow, Straw, Dark Yellow, Vesna    Clarity, UA Clear Clear    Glucose, UA Negative Negative mg/dL    Bilirubin Negative Negative    Ketones, UA Negative Negative    Specific Gravity  1.020 1.005 - 1.030    Blood, UA Trace (A) Negative    pH, Urine 7.0 5.0 - 8.0    Protein, POC Negative Negative mg/dL    Urobilinogen, UA 0.2 E.U./dL Normal, 0.2 E.U./dL    Nitrite, UA Negative Negative    Leukocytes Negative Negative   Independent renal ultrasound review  The renal ultrasound is available for me to review.  Treatment recommendations require an independent review.  This film has been reviewed by the radiologist to determine any non urologic abnormalities that are presents.  I inspected the kidneys for size, symmetry, contour, parenchymal thickness, perinephric reaction, presence of calcifications, and intrarenal dilation of the collecting system.  This US shows hydronephrosis resolved.  Bilateral remaining nonobstructing renal stones seen.  Assessment and Plan    Diagnoses and all orders for this visit:    1. Kidney stone (Primary)  -     POC Urinalysis Dipstick, Multipro  -     Cancel: XR abdomen kub; Future  -     XR Abdomen KUB; Future      Spontaneous passage of 5 mm right proximal ureteral stone.  Was sent for tissue pathology revealing calcium oxalate.  Repeat renal ultrasound showing hydronephrosis resolved.  Bilateral remaining nonobstructing renal stones seen.    Have provided patient with calcium oxalate dietary sheet have encouraged increased fluid intake and lemonade therapy    Recommend follow-up in 1 year with KUB prior given the known remaining stone burden

## 2023-11-10 ENCOUNTER — HOSPITAL ENCOUNTER (OUTPATIENT)
Dept: ULTRASOUND IMAGING | Facility: HOSPITAL | Age: 43
Discharge: HOME | End: 2023-11-10
Payer: COMMERCIAL

## 2023-11-10 DIAGNOSIS — N20.0 KIDNEY STONE: ICD-10-CM

## 2023-11-10 PROCEDURE — 76775 US EXAM ABDO BACK WALL LIM: CPT

## 2023-11-16 ENCOUNTER — OFFICE VISIT (OUTPATIENT)
Dept: UROLOGY | Facility: CLINIC | Age: 43
End: 2023-11-16
Payer: COMMERCIAL

## 2023-11-16 VITALS — BODY MASS INDEX: 42.92 KG/M2 | HEIGHT: 70 IN | WEIGHT: 299.8 LBS | TEMPERATURE: 98.1 F

## 2023-11-16 DIAGNOSIS — N20.0 KIDNEY STONE: Primary | ICD-10-CM

## 2023-11-16 LAB
BILIRUB BLD-MCNC: NEGATIVE MG/DL
CLARITY, POC: CLEAR
COLOR UR: YELLOW
GLUCOSE UR STRIP-MCNC: NEGATIVE MG/DL
KETONES UR QL: NEGATIVE
LEUKOCYTE EST, POC: NEGATIVE
NITRITE UR-MCNC: NEGATIVE MG/ML
PH UR: 7 [PH] (ref 5–8)
PROT UR STRIP-MCNC: NEGATIVE MG/DL
RBC # UR STRIP: ABNORMAL /UL
SP GR UR: 1.02 (ref 1–1.03)
UROBILINOGEN UR QL: ABNORMAL

## 2024-01-18 RX ORDER — LISINOPRIL 40 MG/1
40 TABLET ORAL DAILY
COMMUNITY

## 2024-01-18 RX ORDER — HYDROCODONE BITARTRATE AND ACETAMINOPHEN 10; 325 MG/1; MG/1
1 TABLET ORAL EVERY 8 HOURS PRN
COMMUNITY

## 2024-01-18 RX ORDER — CALCIUM CARBONATE 500 MG/1
1 TABLET, CHEWABLE ORAL DAILY
COMMUNITY

## 2024-01-18 RX ORDER — HYDROCHLOROTHIAZIDE 12.5 MG/1
12.5 CAPSULE, GELATIN COATED ORAL DAILY
COMMUNITY

## 2024-01-18 RX ORDER — CHOLECALCIFEROL (VITAMIN D3) 1250 MCG
1 CAPSULE ORAL DAILY
COMMUNITY

## 2024-01-18 RX ORDER — ONDANSETRON HYDROCHLORIDE 8 MG/1
8 TABLET, FILM COATED ORAL EVERY 8 HOURS PRN
COMMUNITY

## 2024-01-18 RX ORDER — TAMSULOSIN HYDROCHLORIDE 0.4 MG/1
0.4 CAPSULE ORAL DAILY
COMMUNITY

## 2024-01-18 NOTE — PROGRESS NOTES
Positive for arthralgias and back pain. Negative for joint swelling and myalgias.   Skin:  Negative for color change and rash.   Neurological:  Negative for dizziness, tremors, seizures, syncope and light-headedness.   Hematological:  Negative for adenopathy. Does not bruise/bleed easily.   Psychiatric/Behavioral:  Negative for behavioral problems and suicidal ideas. The patient is not nervous/anxious.    All other systems reviewed and are negative.      Objective   BP (!) 140/98   Pulse (!) 107   Temp 98.5 °F (36.9 °C)   Ht 1.778 m (5' 10\")   Wt 136 kg (299 lb 14.4 oz)   SpO2 97%   BMI 43.03 kg/m²     PHYSICAL EXAM:  Physical Exam  Vitals reviewed.   Constitutional:       General: He is not in acute distress.     Appearance: He is well-developed.   HENT:      Head: Normocephalic and atraumatic.      Nose: Nose normal.   Eyes:      General: No scleral icterus.     Conjunctiva/sclera: Conjunctivae normal.   Neck:      Vascular: No JVD.      Trachea: No tracheal deviation.   Cardiovascular:      Rate and Rhythm: Normal rate and regular rhythm.      Heart sounds: Normal heart sounds. No murmur heard.  Pulmonary:      Effort: Pulmonary effort is normal. No respiratory distress.      Breath sounds: Normal breath sounds. No wheezing.   Abdominal:      General: Bowel sounds are normal. There is no distension.      Palpations: Abdomen is soft. There is no mass.      Tenderness: There is no abdominal tenderness.      Comments: Liver nor spleen are readily palpable however difficult due to body habitus   Musculoskeletal:         General: No tenderness or deformity. Normal range of motion.   Lymphadenopathy:      Cervical: No cervical adenopathy.      Upper Body:      Right upper body: No supraclavicular or axillary adenopathy.      Left upper body: No supraclavicular or axillary adenopathy.      Lower Body: No right inguinal adenopathy. No left inguinal adenopathy.   Skin:     Findings: No erythema or rash.

## 2024-01-22 ENCOUNTER — TELEPHONE (OUTPATIENT)
Dept: HEMATOLOGY | Age: 44
End: 2024-01-22

## 2024-01-23 ENCOUNTER — HOSPITAL ENCOUNTER (OUTPATIENT)
Dept: INFUSION THERAPY | Age: 44
Discharge: HOME | End: 2024-01-23
Payer: COMMERCIAL

## 2024-01-23 ENCOUNTER — OFFICE VISIT (OUTPATIENT)
Dept: HEMATOLOGY | Age: 44
End: 2024-01-23
Payer: COMMERCIAL

## 2024-01-23 ENCOUNTER — TELEPHONE (OUTPATIENT)
Dept: HEMATOLOGY | Age: 44
End: 2024-01-23

## 2024-01-23 VITALS
HEIGHT: 70 IN | BODY MASS INDEX: 42.93 KG/M2 | TEMPERATURE: 98.5 F | WEIGHT: 299.9 LBS | HEART RATE: 107 BPM | DIASTOLIC BLOOD PRESSURE: 98 MMHG | OXYGEN SATURATION: 97 % | SYSTOLIC BLOOD PRESSURE: 140 MMHG

## 2024-01-23 DIAGNOSIS — E61.1 LOW IRON: Primary | ICD-10-CM

## 2024-01-23 DIAGNOSIS — R71.8 MICROCYTOSIS: ICD-10-CM

## 2024-01-23 DIAGNOSIS — D72.829 LEUKOCYTOSIS, UNSPECIFIED TYPE: ICD-10-CM

## 2024-01-23 DIAGNOSIS — R93.7 ABNORMAL MRI, LUMBAR SPINE: ICD-10-CM

## 2024-01-23 DIAGNOSIS — R93.7 ABNORMAL MRI, LUMBAR SPINE: Primary | ICD-10-CM

## 2024-01-23 LAB
ALBUMIN SERPL-MCNC: 4.9 G/DL (ref 3.5–5.2)
ALP SERPL-CCNC: 101 U/L (ref 40–130)
ALT SERPL-CCNC: 48 U/L (ref 21–72)
ANION GAP SERPL CALCULATED.3IONS-SCNC: 14 MMOL/L (ref 7–19)
AST SERPL-CCNC: 45 U/L (ref 17–59)
BASOPHILS # BLD: 0.1 K/UL (ref 0.01–0.08)
BASOPHILS NFR BLD: 1.4 % (ref 0.1–1.2)
BILIRUB SERPL-MCNC: 0.5 MG/DL (ref 0.2–1.3)
BUN SERPL-MCNC: 14 MG/DL (ref 9–20)
CALCIUM SERPL-MCNC: 8.9 MG/DL (ref 8.4–10.2)
CHLORIDE SERPL-SCNC: 99 MMOL/L (ref 98–111)
CO2 SERPL-SCNC: 28 MMOL/L (ref 22–29)
CREAT SERPL-MCNC: 1 MG/DL (ref 0.6–1.2)
EOSINOPHIL # BLD: 0.18 K/UL (ref 0.04–0.54)
EOSINOPHIL NFR BLD: 2.6 % (ref 0.7–7)
ERYTHROCYTE [DISTWIDTH] IN BLOOD BY AUTOMATED COUNT: 13.2 % (ref 11.6–14.4)
FERRITIN SERPL-MCNC: 298.8 NG/ML (ref 30–400)
GLOBULIN: 3.5 G/DL
GLUCOSE SERPL-MCNC: 120 MG/DL (ref 74–106)
HCT VFR BLD AUTO: 42 % (ref 40.1–51)
HGB BLD-MCNC: 14.2 G/DL (ref 13.7–17.5)
IRON SATN MFR SERPL: 11 % (ref 14–50)
IRON SERPL-MCNC: 33 UG/DL (ref 59–158)
LDH SERPL-CCNC: 196 U/L (ref 120–246)
LYMPHOCYTES # BLD: 0.66 K/UL (ref 1.18–3.74)
LYMPHOCYTES NFR BLD: 9.5 % (ref 19.3–53.1)
MCH RBC QN AUTO: 26.4 PG (ref 25.7–32.2)
MCHC RBC AUTO-ENTMCNC: 33.8 G/DL (ref 32.3–36.5)
MCV RBC AUTO: 78.1 FL (ref 79–92.2)
MONOCYTES # BLD: 0.84 K/UL (ref 0.24–0.82)
MONOCYTES NFR BLD: 12.1 % (ref 4.7–12.5)
NEUTROPHILS # BLD: 5.14 K/UL (ref 1.56–6.13)
NEUTS SEG NFR BLD: 74 % (ref 34–71.1)
PLATELET # BLD AUTO: 244 K/UL (ref 163–337)
PMV BLD AUTO: 9.5 FL (ref 7.4–10.4)
POTASSIUM SERPL-SCNC: 3.4 MMOL/L (ref 3.5–5.1)
PROT SERPL-MCNC: 8 G/DL (ref 6.3–8.2)
RBC # BLD AUTO: 5.38 M/UL (ref 4.63–6.08)
SODIUM SERPL-SCNC: 141 MMOL/L (ref 137–145)
TIBC SERPL-MCNC: 291 UG/DL (ref 250–400)
WBC # BLD AUTO: 6.95 K/UL (ref 4.23–9.07)

## 2024-01-23 PROCEDURE — 99202 OFFICE O/P NEW SF 15 MIN: CPT

## 2024-01-23 PROCEDURE — 80053 COMPREHEN METABOLIC PANEL: CPT

## 2024-01-23 PROCEDURE — 36415 COLL VENOUS BLD VENIPUNCTURE: CPT

## 2024-01-23 PROCEDURE — 85025 COMPLETE CBC W/AUTO DIFF WBC: CPT

## 2024-01-23 PROCEDURE — 99204 OFFICE O/P NEW MOD 45 MIN: CPT | Performed by: PHYSICIAN ASSISTANT

## 2024-01-23 PROCEDURE — 83615 LACTATE (LD) (LDH) ENZYME: CPT

## 2024-01-23 RX ORDER — KETOROLAC TROMETHAMINE 10 MG/1
10 TABLET, FILM COATED ORAL EVERY 6 HOURS PRN
COMMUNITY
Start: 2023-09-26

## 2024-01-23 RX ORDER — DEXTROMETHORPHAN HYDROBROMIDE AND PROMETHAZINE HYDROCHLORIDE 15; 6.25 MG/5ML; MG/5ML
5 SYRUP ORAL 3 TIMES DAILY PRN
COMMUNITY
Start: 2019-08-16

## 2024-01-23 RX ORDER — FERROUS SULFATE 325(65) MG
325 TABLET ORAL
Qty: 30 TABLET | Refills: 1 | Status: SHIPPED | OUTPATIENT
Start: 2024-01-23

## 2024-01-23 RX ORDER — FLUTICASONE PROPIONATE 50 MCG
2 SPRAY, SUSPENSION (ML) NASAL PRN
COMMUNITY

## 2024-01-23 RX ORDER — ACYCLOVIR 400 MG/1
400 TABLET ORAL PRN
COMMUNITY
Start: 2019-06-27

## 2024-01-23 ASSESSMENT — ENCOUNTER SYMPTOMS
ABDOMINAL DISTENTION: 0
CONSTIPATION: 0
TROUBLE SWALLOWING: 0
WHEEZING: 0
DIARRHEA: 0
SHORTNESS OF BREATH: 0
COLOR CHANGE: 0
COUGH: 0
BLOOD IN STOOL: 0
NAUSEA: 0
ABDOMINAL PAIN: 0
SORE THROAT: 0
EYE ITCHING: 0
EYE DISCHARGE: 0
VOICE CHANGE: 0
PHOTOPHOBIA: 0
BACK PAIN: 1

## 2024-01-23 ASSESSMENT — PROMIS GLOBAL HEALTH SCALE
IN THE PAST 7 DAYS, HOW OFTEN HAVE YOU BEEN BOTHERED BY EMOTIONAL PROBLEMS, SUCH AS FEELING ANXIOUS, DEPRESSED, OR IRRITABLE [ON A SCALE FROM 1 (NEVER) TO 5 (ALWAYS)]?: 5
IN GENERAL, HOW WOULD YOU RATE YOUR SATISFACTION WITH YOUR SOCIAL ACTIVITIES AND RELATIONSHIPS [ON A SCALE OF 1 (POOR) TO 5 (EXCELLENT)]?: 5
IN GENERAL, WOULD YOU SAY YOUR HEALTH IS...[ON A SCALE OF 1 (POOR) TO 5 (EXCELLENT)]: 3
SUM OF RESPONSES TO QUESTIONS 3, 6, 7, & 8: 14
IN GENERAL, HOW WOULD YOU RATE YOUR PHYSICAL HEALTH [ON A SCALE OF 1 (POOR) TO 5 (EXCELLENT)]?: 3
SUM OF RESPONSES TO QUESTIONS 2, 4, 5, & 10: 19
IN THE PAST 7 DAYS, HOW WOULD YOU RATE YOUR PAIN ON AVERAGE [ON A SCALE FROM 0 (NO PAIN) TO 10 (WORST IMAGINABLE PAIN)]?: 3
TO WHAT EXTENT ARE YOU ABLE TO CARRY OUT YOUR EVERYDAY PHYSICAL ACTIVITIES SUCH AS WALKING, CLIMBING STAIRS, CARRYING GROCERIES, OR MOVING A CHAIR [ON A SCALE OF 1 (NOT AT ALL) TO 5 (COMPLETELY)]?: 5
IN GENERAL, HOW WOULD YOU RATE YOUR MENTAL HEALTH, INCLUDING YOUR MOOD AND YOUR ABILITY TO THINK [ON A SCALE OF 1 (POOR) TO 5 (EXCELLENT)]?: 5
IN THE PAST 7 DAYS, HOW WOULD YOU RATE YOUR FATIGUE ON AVERAGE [ON A SCALE FROM 1 (NONE) TO 5 (VERY SEVERE)]?: 3
IN GENERAL, PLEASE RATE HOW WELL YOU CARRY OUT YOUR USUAL SOCIAL ACTIVITIES (INCLUDES ACTIVITIES AT HOME, AT WORK, AND IN YOUR COMMUNITY, AND RESPONSIBILITIES AS A PARENT, CHILD, SPOUSE, EMPLOYEE, FRIEND, ETC) [ON A SCALE OF 1 (POOR) TO 5 (EXCELLENT)]?: 5
IN GENERAL, WOULD YOU SAY YOUR QUALITY OF LIFE IS...[ON A SCALE OF 1 (POOR) TO 5 (EXCELLENT)]: 4

## 2024-01-23 NOTE — TELEPHONE ENCOUNTER
----- Message from Louis Louis PA-C sent at 1/23/2024  4:29 PM CST -----  Start ferrous sulfate 325 daily.  He needs FOBT x 3 collected

## 2024-01-26 LAB — B2 MICROGLOB SERPL-MCNC: 2.8 MG/L (ref 0.8–2.4)

## 2024-01-27 LAB
ALBUMIN SERPL-MCNC: 4.57 G/DL (ref 3.75–5.01)
ALPHA1 GLOB SERPL ELPH-MCNC: 0.3 G/DL (ref 0.19–0.46)
ALPHA2 GLOB SERPL ELPH-MCNC: 0.62 G/DL (ref 0.48–1.05)
B-GLOBULIN SERPL ELPH-MCNC: 0.95 G/DL (ref 0.48–1.1)
DEPRECATED KAPPA LC FREE/LAMBDA SER: 1.61 {RATIO} (ref 0.26–1.65)
EER MONOCLONAL PROTEIN AND FLC, SERUM: ABNORMAL
GAMMA GLOB SERPL ELPH-MCNC: 1.25 G/DL (ref 0.62–1.51)
IGA SERPL-MCNC: 308 MG/DL (ref 68–408)
IGG SERPL-MCNC: 1158 MG/DL (ref 768–1632)
IGM SERPL-MCNC: 186 MG/DL (ref 35–263)
INTERPRETATION SERPL IFE-IMP: ABNORMAL
INTERPRETATION SERPL IFE-IMP: ABNORMAL
KAPPA LC FREE SER-MCNC: 24.27 MG/L (ref 3.3–19.4)
LAMBDA LC FREE SERPL-MCNC: 15.09 MG/L (ref 5.71–26.3)
MONOCLONAL PROTEIN, SERUM: ABNORMAL G/DL
PROT SERPL-MCNC: 7.7 G/DL (ref 6.3–8.2)

## 2024-01-29 LAB
CALR EXON 9 MUT ANL BLD/T: NORMAL
CITATION REF LAB TEST: NORMAL
JAK2 GENE MUT ANL BLD/T: NORMAL
JAK2 P.V617F BLD/T QL: NORMAL
LAB DIRECTOR NAME PROVIDER: NORMAL
MPL GENE MUT TESTED MAR: NORMAL
REF LAB TEST METHOD: NORMAL
REFLEX: NORMAL
TEST PERFORMANCE INFO SPEC: NORMAL

## 2024-02-09 ENCOUNTER — HOSPITAL ENCOUNTER (OUTPATIENT)
Dept: INFUSION THERAPY | Age: 44
Discharge: HOME | End: 2024-02-09

## 2024-02-09 DIAGNOSIS — R71.8 MICROCYTOSIS: ICD-10-CM

## 2024-02-09 DIAGNOSIS — D72.829 LEUKOCYTOSIS, UNSPECIFIED TYPE: Primary | ICD-10-CM

## 2024-02-09 DIAGNOSIS — D72.829 LEUKOCYTOSIS, UNSPECIFIED TYPE: ICD-10-CM

## 2024-02-09 LAB
HEMOCCULT SP1 STL QL: NORMAL
HEMOCCULT SP2 STL QL: NORMAL
HEMOCCULT SP3 STL QL: NORMAL

## 2024-02-22 ENCOUNTER — TELEPHONE (OUTPATIENT)
Dept: HEMATOLOGY | Age: 44
End: 2024-02-22

## 2024-02-22 NOTE — TELEPHONE ENCOUNTER
Called patient and reminded patient of their appointment on 02/26/2024and patient confirmed they would be here.

## 2024-02-23 NOTE — PROGRESS NOTES
Progress Note      Pt Name: Oren Carrillo  YOB: 1980  MRN: [id number]    Date of evaluation: 2/26/2024  History Obtained From:  patient, electronic medical record    CHIEF COMPLAINT:    Chief Complaint   Patient presents with    Follow-up     Current active problems  Iron deficiency anemia  Nephrolithiasis      HISTORY OF PRESENT ILLNESS:    Oren SERRANO) is a 43 y.o.  male seen initially in the office on 2/26/2024 for an abnormal finding on lumbar MRI that was suspicious for red marrow or possible underlying marrow infiltrative process.  Baseline serology was obtained.  He was also noted to have iron deficiency started on 1 iron tablet daily which he has tolerated fairly well until last week when he had constipation-mild.  He did take Colace which seemed to help.  He did return his FOBT x 3 that were negative.  He has a significant history of nephrolithiasis dating back to March 2023 requiring lithotripsy by Dr. Henderson.    He reports that he went to [facility] last week because he was feeling dizzy like he may pass out and had workup initiated.  He reports that the sensation has improved overall.  He denied any palpitations.  He reports a urinalysis that was obtained that was negative for hematuria.    He has had a fatty liver noted on CT imaging of the abdomen previously.  He has a pancreatic cyst that is being followed by serial imaging.  He does have essential hypertension and is on lisinopril 40 mg daily, HCTZ 12.5 daily.  He is on tamsulosin 0.4 as needed.    HEMATOLOGY HISTORY:  Ion was seen in initial hematology consultation on 2/26/2024 referred from [facility] for evaluation of abnormal finding on lumbar MRI.      Ion developed issues with bilateral hip and lower back pain.  Workup led to imaging that included MRI of the lumbar spine.  He reports that he has had intermittent elevated white count.  He denies any history of splenectomy.  He denies being on steroids

## 2024-02-26 ENCOUNTER — OFFICE VISIT (OUTPATIENT)
Dept: HEMATOLOGY | Age: 44
End: 2024-02-26
Payer: COMMERCIAL

## 2024-02-26 ENCOUNTER — HOSPITAL ENCOUNTER (OUTPATIENT)
Dept: INFUSION THERAPY | Age: 44
Discharge: HOME | End: 2024-02-26
Payer: COMMERCIAL

## 2024-02-26 VITALS
BODY MASS INDEX: 42.31 KG/M2 | HEART RATE: 77 BPM | DIASTOLIC BLOOD PRESSURE: 78 MMHG | HEIGHT: 70 IN | SYSTOLIC BLOOD PRESSURE: 120 MMHG | TEMPERATURE: 98 F | OXYGEN SATURATION: 98 % | WEIGHT: 295.5 LBS

## 2024-02-26 DIAGNOSIS — R93.7 ABNORMAL MRI, LUMBAR SPINE: Primary | ICD-10-CM

## 2024-02-26 DIAGNOSIS — R71.8 MICROCYTOSIS: ICD-10-CM

## 2024-02-26 DIAGNOSIS — K86.2 PANCREATIC CYST: ICD-10-CM

## 2024-02-26 DIAGNOSIS — D72.829 LEUKOCYTOSIS, UNSPECIFIED TYPE: ICD-10-CM

## 2024-02-26 LAB
BASOPHILS # BLD: 0.14 K/UL (ref 0.01–0.08)
BASOPHILS NFR BLD: 1.2 % (ref 0.1–1.2)
CANCER AG19-9 SERPL-ACNC: 17 U/ML (ref 0–35)
CEA SERPL-MCNC: 3.7 NG/ML (ref 0–4.7)
EOSINOPHIL # BLD: 0.28 K/UL (ref 0.04–0.54)
EOSINOPHIL NFR BLD: 2.4 % (ref 0.7–7)
ERYTHROCYTE [DISTWIDTH] IN BLOOD BY AUTOMATED COUNT: 13 % (ref 11.6–14.4)
FERRITIN SERPL-MCNC: 278.9 NG/ML (ref 30–400)
HCT VFR BLD AUTO: 42.4 % (ref 40.1–51)
HGB BLD-MCNC: 14.2 G/DL (ref 13.7–17.5)
IRON SATN MFR SERPL: 21 % (ref 14–50)
IRON SERPL-MCNC: 61 UG/DL (ref 59–158)
LYMPHOCYTES # BLD: 2.14 K/UL (ref 1.18–3.74)
LYMPHOCYTES NFR BLD: 18.6 % (ref 19.3–53.1)
MCH RBC QN AUTO: 26.3 PG (ref 25.7–32.2)
MCHC RBC AUTO-ENTMCNC: 33.5 G/DL (ref 32.3–36.5)
MCV RBC AUTO: 78.7 FL (ref 79–92.2)
MONOCYTES # BLD: 0.79 K/UL (ref 0.24–0.82)
MONOCYTES NFR BLD: 6.9 % (ref 4.7–12.5)
NEUTROPHILS # BLD: 8.13 K/UL (ref 1.56–6.13)
NEUTS SEG NFR BLD: 70.6 % (ref 34–71.1)
PLATELET # BLD AUTO: 298 K/UL (ref 163–337)
PMV BLD AUTO: 9.5 FL (ref 7.4–10.4)
RBC # BLD AUTO: 5.39 M/UL (ref 4.63–6.08)
TIBC SERPL-MCNC: 285 UG/DL (ref 250–400)
WBC # BLD AUTO: 11.51 K/UL (ref 4.23–9.07)

## 2024-02-26 PROCEDURE — 85025 COMPLETE CBC W/AUTO DIFF WBC: CPT

## 2024-02-26 PROCEDURE — 99214 OFFICE O/P EST MOD 30 MIN: CPT | Performed by: PHYSICIAN ASSISTANT

## 2024-02-26 PROCEDURE — 99212 OFFICE O/P EST SF 10 MIN: CPT

## 2024-02-26 PROCEDURE — 36415 COLL VENOUS BLD VENIPUNCTURE: CPT

## 2024-02-26 ASSESSMENT — ENCOUNTER SYMPTOMS
VOMITING: 0
COLOR CHANGE: 0
TROUBLE SWALLOWING: 0
EYE DISCHARGE: 0
BLOOD IN STOOL: 0
DIARRHEA: 0
SHORTNESS OF BREATH: 0
CONSTIPATION: 0
NAUSEA: 0
COUGH: 0
ABDOMINAL DISTENTION: 0
SORE THROAT: 0
WHEEZING: 0
PHOTOPHOBIA: 0
EYE ITCHING: 0
BACK PAIN: 1
ABDOMINAL PAIN: 0
VOICE CHANGE: 0

## 2024-04-08 NOTE — PROGRESS NOTES
Subjective    Mr. Spencer is 43 y.o. male    Chief Complaint: right ureteral stone    History of Present Illness    43-year-old male established patient in with new finding of right 4 mm proximal ureteral stone after patient underwent CT [facility] due to a follow-up on a pancreatic cyst, leading to the incidental finding of the ureteral stone.  Patient reports approximately 2 to 3 weeks ago experienced a mild discomfort along the right flank only.  History of spontaneous 5 mm right proximal ureteral stone passage 11/2023.  Patient with known remaining stones bilaterally.  History of calcium oxalate stone disease.  Last surgical intervention 7 mm left proximal ureteral stone ESWL April 2023 Dr. Avila.      The following portions of the patient's history were reviewed and updated as appropriate: allergies, current medications, past family history, past medical history, past social history, past surgical history and problem list.    Review of Systems   Constitutional:  Negative for chills and fever.   Gastrointestinal:  Negative for abdominal pain, anal bleeding, blood in stool, nausea and vomiting.   Genitourinary:  Positive for flank pain. Negative for decreased urine volume, difficulty urinating, dysuria, frequency, hematuria, testicular pain and urgency.         Current Outpatient Medications:     Cholecalciferol (Vitamin D3) 1.25 MG (42684 UT) capsule, , Disp: , Rfl:     econazole nitrate (SPECTAZOLE) 1 % cream, 1 Application Daily., Disp: , Rfl:     ferrous sulfate 325 (65 FE) MG tablet, Take 1 tablet by mouth., Disp: , Rfl:     fluticasone (FLONASE) 50 MCG/ACT nasal spray, 2 sprays into the nostril(s) as directed by provider 2 (Two) Times a Day., Disp: 48 g, Rfl: 3    hydroCHLOROthiazide (HYDRODIURIL) 12.5 MG tablet, Take 1 tablet by mouth Every Morning., Disp: , Rfl:     HYDROcodone-acetaminophen (NORCO)  MG per tablet, Take 1 tablet by mouth Every 8 (Eight) Hours As  "Needed., Disp: , Rfl:     ketorolac (TORADOL) 10 MG tablet, Take 1 tablet by mouth Every 6 (Six) Hours As Needed for Moderate Pain., Disp: 15 tablet, Rfl: 0    lisinopril (PRINIVIL,ZESTRIL) 40 MG tablet, , Disp: , Rfl:     naloxone (NARCAN) 4 MG/0.1ML nasal spray, CALL 911. Do not prime. Spray into nostril upon signs of opioid overdose. May repeat in 2 to 3 minutes in opposite nostril if no or minimal breathing and responsiveness, then as needed (if doses are available) every 2 to 3 minutes., Disp: 2 each, Rfl: 0    ondansetron (ZOFRAN) 8 MG tablet, Daily., Disp: , Rfl:     ondansetron ODT (ZOFRAN-ODT) 4 MG disintegrating tablet, Place 1 tablet on the tongue Every 4 (Four) Hours As Needed for Nausea or Vomiting., Disp: 10 tablet, Rfl: 0    tamsulosin (FLOMAX) 0.4 MG capsule 24 hr capsule, Take 1 capsule by mouth 2 (Two) Times a Day., Disp: 60 capsule, Rfl: 0    Past Medical History:   Diagnosis Date    Bell's palsy     Hypertension     Kidney stone     Pancreatic cyst     Sleep apnea        Past Surgical History:   Procedure Laterality Date    EXTRACORPOREAL SHOCK WAVE LITHOTRIPSY (ESWL) Left 4/14/2023    Procedure: EXTRACORPOREAL SHOCKWAVE LITHOTRIPSY-left;  Surgeon: Mt Avila MD;  Location: [facility];  Service: Urology;  Laterality: Left;       Social History     Socioeconomic History    Marital status:    Tobacco Use    Smoking status: Never    Smokeless tobacco: Never   Vaping Use    Vaping status: Never Used   Substance and Sexual Activity    Alcohol use: Never    Drug use: Never       Family History   Problem Relation Age of Onset    Diabetes type II Father     Heart disease Father     Diabetes type II Maternal Grandmother        Objective    Ht 177.8 cm (70\")   Wt 134 kg (296 lb 6.4 oz)   BMI 42.53 kg/m²     Physical Exam  Constitutional:       Appearance: Normal appearance.   Abdominal:      Tenderness: There is no right CVA tenderness or left CVA tenderness.   Skin:     General: Skin is " warm and dry.   Neurological:      Mental Status: He is alert and oriented to person, place, and time.   Psychiatric:         Mood and Affect: Mood normal.         Behavior: Behavior normal.             Results for orders placed or performed in visit on 04/15/24   POC Urinalysis Dipstick, Multipro    Specimen: Urine   Result Value Ref Range    Color Yellow Yellow, Straw, Dark Yellow, Vesna    Clarity, UA Clear Clear    Glucose, UA Negative Negative mg/dL    Bilirubin Negative Negative    Ketones, UA Negative Negative    Specific Gravity  1.020 1.005 - 1.030    Blood, UA Trace (A) Negative    pH, Urine 7.0 5.0 - 8.0    Protein, POC Negative Negative mg/dL    Urobilinogen, UA 0.2 E.U./dL Normal, 0.2 E.U./dL    Nitrite, UA Negative Negative    Leukocytes Negative Negative   KUB independent review    A KUB is available for me to review today.  The image is inspected for a bowel gas pattern and the general bone structure of the spine and pelvis. The kidneys are then inspected closely.  Renal outline is noted if identifiable. The kidney, collecting system, and anticipated path of the ureter are examined for calcifications including those in the true pelvis.  This film reveals:    On the right there is a 4 mm stone within the right proximal to mid ureter with a right upper pole renal stone..    On the left there are multiple renal stones left lower pole renal stones.  IMAGING SCANNED (04/05/2024)   Assessment and Plan    Diagnoses and all orders for this visit:    1. Kidney stone (Primary)  -     POC Urinalysis Dipstick, Multipro  -     XR abdomen kub; Future      Currently asymptomatic.  Denies any flank pain or hematuria.  Based on KUB right 4 mm proximal to mid ureteral stone visualized.  Based on this finding patient would like to continue to try expulsive therapy as he had recently passed a different stone back in November that was slightly larger.  Patient already has a prescription of tamsulosin and started taking  approximately 10 days ago.  Will continue medication at this time.    Will have follow-up in 3 weeks with KUB prior    Have encouraged increasing fluid intake

## 2024-04-12 ENCOUNTER — TELEPHONE (OUTPATIENT)
Dept: UROLOGY | Facility: CLINIC | Age: 44
End: 2024-04-12
Payer: COMMERCIAL

## 2024-04-15 ENCOUNTER — HOSPITAL ENCOUNTER (OUTPATIENT)
Dept: GENERAL RADIOLOGY | Facility: HOSPITAL | Age: 44
Discharge: HOME | End: 2024-04-15
Payer: COMMERCIAL

## 2024-04-15 ENCOUNTER — OFFICE VISIT (OUTPATIENT)
Dept: UROLOGY | Facility: CLINIC | Age: 44
End: 2024-04-15
Payer: COMMERCIAL

## 2024-04-15 VITALS — BODY MASS INDEX: 42.43 KG/M2 | WEIGHT: 296.4 LBS | HEIGHT: 70 IN

## 2024-04-15 DIAGNOSIS — N20.0 KIDNEY STONE: Primary | ICD-10-CM

## 2024-04-15 DIAGNOSIS — N20.0 KIDNEY STONE: ICD-10-CM

## 2024-04-15 PROCEDURE — 99214 OFFICE O/P EST MOD 30 MIN: CPT

## 2024-04-15 PROCEDURE — 81001 URINALYSIS AUTO W/SCOPE: CPT

## 2024-04-15 PROCEDURE — 74018 RADEX ABDOMEN 1 VIEW: CPT

## 2024-04-15 RX ORDER — HYDROCODONE BITARTRATE AND ACETAMINOPHEN 10; 325 MG/1; MG/1
1 TABLET ORAL EVERY 8 HOURS PRN
COMMUNITY

## 2024-04-15 RX ORDER — FERROUS SULFATE 325(65) MG
325 TABLET ORAL
COMMUNITY
Start: 2024-01-23

## 2024-04-17 DIAGNOSIS — E61.1 LOW IRON: ICD-10-CM

## 2024-04-17 RX ORDER — FERROUS SULFATE 325(65) MG
325 TABLET ORAL
Qty: 30 TABLET | Refills: 1 | Status: SHIPPED | OUTPATIENT
Start: 2024-04-17

## 2024-04-17 NOTE — H&P (VIEW-ONLY)
Subjective    Mr. Spencer is 43 y.o. male    Chief Complaint: right ureteral stone    History of Present Illness    43-year-old male established patient in follow-up right 4 mm proximal ureteral stone after patient underwent CT abdomen and [facility] due to a follow-up on a pancreatic cyst, leading to the incidental finding of the ureteral stone.  Patient opted to continue to try expulsive therapy as patient had previously passed a 5 mm stone right proximal ureter 11/2023.  Does not feel as though he has passed the stone started experiencing flank pain while at [employer] yesterday.  Patient with known remaining stones bilaterally.  History of calcium oxalate stone disease.  Last surgical intervention 7 mm left proximal ureteral stone ESWL April 2023 Dr. Avila.     The following portions of the patient's history were reviewed and updated as appropriate: allergies, current medications, past family history, past medical history, past social history, past surgical history and problem list.    Review of Systems   Constitutional:  Negative for chills and fever.   Gastrointestinal:  Negative for abdominal pain, anal bleeding, blood in stool, nausea and vomiting.   Genitourinary:  Positive for flank pain. Negative for dysuria and hematuria.   Musculoskeletal:  Positive for back pain.         Current Outpatient Medications:     calcium carbonate (TUMS) 500 MG chewable tablet, Chew 1 tablet Daily., Disp: , Rfl:     Cholecalciferol (Vitamin D3) 1.25 MG (87948 UT) capsule, , Disp: , Rfl:     econazole nitrate (SPECTAZOLE) 1 % cream, 1 Application Daily., Disp: , Rfl:     ferrous sulfate 325 (65 FE) MG tablet, Take 1 tablet by mouth., Disp: , Rfl:     fluticasone (FLONASE) 50 MCG/ACT nasal spray, 2 sprays into the nostril(s) as directed by provider 2 (Two) Times a Day., Disp: 48 g, Rfl: 3    hydroCHLOROthiazide (HYDRODIURIL) 12.5 MG tablet, Take 1 tablet by mouth Every Morning., Disp: , Rfl:      "HYDROcodone-acetaminophen (NORCO)  MG per tablet, Take 1 tablet by mouth Every 8 (Eight) Hours As Needed., Disp: , Rfl:     ketorolac (TORADOL) 10 MG tablet, Take 1 tablet by mouth Every 6 (Six) Hours As Needed for Moderate Pain., Disp: 15 tablet, Rfl: 0    lisinopril (PRINIVIL,ZESTRIL) 40 MG tablet, , Disp: , Rfl:     naloxone (NARCAN) 4 MG/0.1ML nasal spray, CALL 911. Do not prime. Spray into nostril upon signs of opioid overdose. May repeat in 2 to 3 minutes in opposite nostril if no or minimal breathing and responsiveness, then as needed (if doses are available) every 2 to 3 minutes., Disp: 2 each, Rfl: 0    ondansetron (ZOFRAN) 8 MG tablet, Daily., Disp: , Rfl:     ondansetron ODT (ZOFRAN-ODT) 4 MG disintegrating tablet, Place 1 tablet on the tongue Every 4 (Four) Hours As Needed for Nausea or Vomiting., Disp: 10 tablet, Rfl: 0    tamsulosin (FLOMAX) 0.4 MG capsule 24 hr capsule, Take 1 capsule by mouth 2 (Two) Times a Day., Disp: 30 capsule, Rfl: 0    Past Medical History:   Diagnosis Date    Bell's palsy     Hypertension     Kidney stone     Pancreatic cyst     Sleep apnea        Past Surgical History:   Procedure Laterality Date    EXTRACORPOREAL SHOCK WAVE LITHOTRIPSY (ESWL) Left 4/14/2023    Procedure: EXTRACORPOREAL SHOCKWAVE LITHOTRIPSY-left;  Surgeon: Mt Avila MD;  Location: [facility];  Service: Urology;  Laterality: Left;       Social History     Socioeconomic History    Marital status:    Tobacco Use    Smoking status: Never    Smokeless tobacco: Never   Vaping Use    Vaping status: Never Used   Substance and Sexual Activity    Alcohol use: Never    Drug use: Never       Family History   Problem Relation Age of Onset    Diabetes type II Father     Heart disease Father     Diabetes type II Maternal Grandmother        Objective    Temp 97.5 °F (36.4 °C)   Ht 177.8 cm (70\")   Wt 134 kg (296 lb 6.4 oz)   BMI 42.53 kg/m²     Physical Exam  Constitutional:       Appearance: Normal " appearance.   Abdominal:      Tenderness: There is right CVA tenderness. There is no left CVA tenderness.   Skin:     General: Skin is warm and dry.   Neurological:      Mental Status: He is alert and oriented to person, place, and time.   Psychiatric:         Mood and Affect: Mood normal.         Behavior: Behavior normal.             Results for orders placed or performed in visit on 05/06/24   POC Urinalysis Dipstick, Multipro    Specimen: Urine   Result Value Ref Range    Color Yellow Yellow, Straw, Dark Yellow, Vesna    Clarity, UA Clear Clear    Glucose, UA Negative Negative mg/dL    Bilirubin Negative Negative    Ketones, UA Negative Negative    Specific Gravity  1.010 1.005 - 1.030    Blood, UA Small (A) Negative    pH, Urine 5.5 5.0 - 8.0    Protein, POC Negative Negative mg/dL    Urobilinogen, UA 0.2 E.U./dL Normal, 0.2 E.U./dL    Nitrite, UA Negative Negative    Leukocytes Negative Negative   KUB independent review    A KUB is available for me to review today.  The image is inspected for a bowel gas pattern and the general bone structure of the spine and pelvis. The kidneys are then inspected closely.  Renal outline is noted if identifiable. The kidney, collecting system, and anticipated path of the ureter are examined for calcifications including those in the true pelvis.  This film reveals:    On the right there is a single proximal ureteral stone measuring 4 mm.    On the left there are multiple renal stones.  Largest 8 mm.    Assessment and Plan    Diagnoses and all orders for this visit:    1. Kidney stone (Primary)  -     POC Urinalysis Dipstick, Multipro  -     Case Request; Standing  -     ECG 12 Lead; Future  -     sodium chloride 0.9 % infusion  -     CBC (No Diff); Future  -     Basic Metabolic Panel; Future  -     Urinalysis With Culture If Indicated -; Future  -     levoFLOXacin (LEVAQUIN) 500 mg in sodium chloride 0.9 % 150 mL IVPB  -     Case Request  -     tamsulosin (FLOMAX) 0.4 MG  capsule 24 hr capsule; Take 1 capsule by mouth 2 (Two) Times a Day.  Dispense: 30 capsule; Refill: 0    Other orders  -     Follow Anesthesia Guidelines / Protocol; Future  -     Follow Anesthesia Guidelines / Protocol; Standing  -     Verify / Perform Chlorhexidine Skin Prep; Standing  -     Verify / Perform Chlorhexidine Skin Prep if Indicated (If Not Already Completed); Standing  -     Obtain Informed Consent; Future  -     Provide NPO Instructions to Patient; Future  -     Chlorhexidine Skin Prep; Future  -     XR Abdomen KUB; Standing  -     Place Sequential Compression Device; Standing  -     Maintain Sequential Compression Device; Standing      KUB today stone still visible within the right proximal ureter.  Based on this finding patient would now like to proceed with surgical intervention.  Given that stone is visible on KUB would like to proceed with ESWL.  Will get patient scheduled with Dr. Avila next week.  Was educated on the risks and benefits of the procedure as well as potential side effects.  Will go ahead and send in additional Flomax as patient states he will now try to increase to twice daily to pass the stone in the meantime.     Have encouraged increasing fluid intake

## 2024-05-06 ENCOUNTER — HOSPITAL ENCOUNTER (OUTPATIENT)
Dept: GENERAL RADIOLOGY | Facility: HOSPITAL | Age: 44
Discharge: HOME | End: 2024-05-06
Payer: COMMERCIAL

## 2024-05-06 ENCOUNTER — OFFICE VISIT (OUTPATIENT)
Dept: UROLOGY | Facility: CLINIC | Age: 44
End: 2024-05-06
Payer: COMMERCIAL

## 2024-05-06 ENCOUNTER — TELEPHONE (OUTPATIENT)
Dept: UROLOGY | Facility: CLINIC | Age: 44
End: 2024-05-06
Payer: COMMERCIAL

## 2024-05-06 VITALS — BODY MASS INDEX: 42.43 KG/M2 | TEMPERATURE: 97.5 F | HEIGHT: 70 IN | WEIGHT: 296.4 LBS

## 2024-05-06 DIAGNOSIS — N20.0 KIDNEY STONE: ICD-10-CM

## 2024-05-06 DIAGNOSIS — N20.0 KIDNEY STONE: Primary | ICD-10-CM

## 2024-05-06 LAB
BILIRUB BLD-MCNC: NEGATIVE MG/DL
CLARITY, POC: CLEAR
COLOR UR: YELLOW
GLUCOSE UR STRIP-MCNC: NEGATIVE MG/DL
KETONES UR QL: NEGATIVE
LEUKOCYTE EST, POC: NEGATIVE
NITRITE UR-MCNC: NEGATIVE MG/ML
PH UR: 5.5 [PH] (ref 5–8)
PROT UR STRIP-MCNC: NEGATIVE MG/DL
RBC # UR STRIP: ABNORMAL /UL
SP GR UR: 1.01 (ref 1–1.03)
UROBILINOGEN UR QL: ABNORMAL

## 2024-05-06 PROCEDURE — 74018 RADEX ABDOMEN 1 VIEW: CPT

## 2024-05-06 RX ORDER — TAMSULOSIN HYDROCHLORIDE 0.4 MG/1
1 CAPSULE ORAL 2 TIMES DAILY
Qty: 30 CAPSULE | Refills: 0 | Status: SHIPPED | OUTPATIENT
Start: 2024-05-06

## 2024-05-06 RX ORDER — SODIUM CHLORIDE 9 MG/ML
100 INJECTION, SOLUTION INTRAVENOUS CONTINUOUS
OUTPATIENT
Start: 2024-05-06

## 2024-05-06 RX ORDER — CALCIUM CARBONATE 500 MG/1
1 TABLET, CHEWABLE ORAL DAILY
COMMUNITY

## 2024-05-09 ENCOUNTER — PRE-ADMISSION TESTING (OUTPATIENT)
Dept: PREADMISSION TESTING | Facility: HOSPITAL | Age: 44
End: 2024-05-09
Payer: COMMERCIAL

## 2024-05-09 VITALS
SYSTOLIC BLOOD PRESSURE: 156 MMHG | HEIGHT: 70 IN | WEIGHT: 296.3 LBS | RESPIRATION RATE: 18 BRPM | OXYGEN SATURATION: 97 % | BODY MASS INDEX: 42.42 KG/M2 | HEART RATE: 87 BPM | DIASTOLIC BLOOD PRESSURE: 91 MMHG

## 2024-05-09 DIAGNOSIS — N20.0 KIDNEY STONE: ICD-10-CM

## 2024-05-09 LAB
ANION GAP SERPL CALCULATED.3IONS-SCNC: 11 MMOL/L (ref 5–15)
BILIRUB UR QL STRIP: NEGATIVE
BUN SERPL-MCNC: 13 MG/DL (ref 6–20)
BUN/CREAT SERPL: 13.5 (ref 7–25)
CALCIUM SPEC-SCNC: 9.6 MG/DL (ref 8.6–10.5)
CHLORIDE SERPL-SCNC: 102 MMOL/L (ref 98–107)
CLARITY UR: CLEAR
CO2 SERPL-SCNC: 27 MMOL/L (ref 22–29)
COLOR UR: YELLOW
CREAT SERPL-MCNC: 0.96 MG/DL (ref 0.76–1.27)
DEPRECATED RDW RBC AUTO: 38.4 FL (ref 37–54)
EGFRCR SERPLBLD CKD-EPI 2021: 100.6 ML/MIN/1.73
ERYTHROCYTE [DISTWIDTH] IN BLOOD BY AUTOMATED COUNT: 13.2 % (ref 12.3–15.4)
GLUCOSE SERPL-MCNC: 103 MG/DL (ref 65–99)
GLUCOSE UR STRIP-MCNC: NEGATIVE MG/DL
HCT VFR BLD AUTO: 34.2 % (ref 37.5–51)
HGB BLD-MCNC: 11.4 G/DL (ref 13–17.7)
HGB UR QL STRIP.AUTO: NEGATIVE
KETONES UR QL STRIP: NEGATIVE
LEUKOCYTE ESTERASE UR QL STRIP.AUTO: NEGATIVE
MCH RBC QN AUTO: 26.4 PG (ref 26.6–33)
MCHC RBC AUTO-ENTMCNC: 33.3 G/DL (ref 31.5–35.7)
MCV RBC AUTO: 79.2 FL (ref 79–97)
NITRITE UR QL STRIP: NEGATIVE
PH UR STRIP.AUTO: 8.5 [PH] (ref 5–8)
PLATELET # BLD AUTO: 227 10*3/MM3 (ref 140–450)
PMV BLD AUTO: 10.4 FL (ref 6–12)
POTASSIUM SERPL-SCNC: 3.6 MMOL/L (ref 3.5–5.2)
PROT UR QL STRIP: NEGATIVE
RBC # BLD AUTO: 4.32 10*6/MM3 (ref 4.14–5.8)
SODIUM SERPL-SCNC: 140 MMOL/L (ref 136–145)
SP GR UR STRIP: 1.01 (ref 1–1.03)
UROBILINOGEN UR QL STRIP: ABNORMAL
WBC NRBC COR # BLD AUTO: 12.21 10*3/MM3 (ref 3.4–10.8)

## 2024-05-09 PROCEDURE — 85027 COMPLETE CBC AUTOMATED: CPT

## 2024-05-09 PROCEDURE — 36415 COLL VENOUS BLD VENIPUNCTURE: CPT

## 2024-05-09 PROCEDURE — 93005 ELECTROCARDIOGRAM TRACING: CPT

## 2024-05-09 PROCEDURE — 80048 BASIC METABOLIC PNL TOTAL CA: CPT

## 2024-05-09 PROCEDURE — 81003 URINALYSIS AUTO W/O SCOPE: CPT

## 2024-05-09 RX ORDER — IBUPROFEN 200 MG
200 TABLET ORAL EVERY 6 HOURS PRN
COMMUNITY

## 2024-05-09 NOTE — DISCHARGE INSTRUCTIONS
Preparing for Surgery  Follow these instructions before the procedure:  Several days or weeks before your procedure  Medication(s) you need to stop   ____5___ days prior to surgery Follow Dr Avila's instructions regarding anti-inflammatories      Ask your health care provider about:  Changing or stopping your regular medicines. This is especially important if you are taking diabetes medicines or blood thinners.  Taking medicines such as aspirin and ibuprofen. These medicines can thin your blood. Do not take these medicines unless your health care provider tells you to take them.  Taking over-the-counter medicines, vitamins, herbs, and supplements.    Contact your surgeon if you:  Develop a fever of more than 100.4°F (38°C) or other feelings of illness during the 48 hours before your surgery.  Have symptoms that get worse.  Have questions or concerns about your surgery.  If you are going home the same day of your surgery you will need to arrange for a responsible adult, age 18 years old or older, to drive you home from the hospital and stay with you for 24 hours. Verification of the  will be made prior to any procedure requiring sedation. You may not go home in a taxi or any form of public transportation by yourself.     Day before your procedure  Medication(s) you need to stop the day before your surgery:Lisinopril    24 hours before your procedure DO NOT drink alcoholic beverages or smoke.  24 hours before your procedure STOP taking Erectile Dysfunction medication (i.e.,Cialis, Viagra)   You may be asked to shower with a germ-killing soap.  Day of your procedure   You may take the following medication(s) the morning of surgery with a sip of water: Norco      8 hours before your procedure STOP all food, any dairy products, and full liquids. This includes hard candy, chewing gum or mints. This is extremely important to prevent serious complications.     Up to 2 hours before your scheduled arrival time, you may  have clear liquids no cream, powder, or pulp of any kind. Safe options are water, black coffee, plain tea, soda, Gatorade/Powerade, clear broth, apple juice.    2 hours before your scheduled arrival time, STOP drinking clear liquids.    You may need to take another shower with a germ-killing soap before you leave home in the morning. Do not use perfumes, colognes, or body lotions.  Wear comfortable loose-fitting clothing.  Remove all jewelry including body piercing and rings, dark colored nail polish, and make up prior to arrival at the hospital. Leave all valuables at home.   Bring your hearing aids if you rely on them.  Do not wear contact lenses. If you wear eyeglasses remember to bring a case to store them in while you are in surgery.  Do not use denture adhesives since you will be asked to remove them during your surgery.    You do not need to bring your home medications into the hospital.   Bring your sleep apnea device with you on the day of your surgery (if this applies to you).  If you wear portable oxygen, bring it with you.   If you are staying overnight, you may bring a bag of items you may need such as slippers, robe and a change of clothes for your discharge. You may want to leave these items in the car until you are ready for them since your family will take your belongings when you leave the pre-operative area.  Arrive at the hospital as scheduled by the office. You will be asked to arrive 2 hours prior to your surgery time in order to prepare for your procedure.  When you arrive at the hospital  Go to the registration desk located at the main entrance of the hospital.  After registration is completed, you will be given a beeper and a sticker sheet. Take the stickers to Outpatient Surgery and place in the tray at the end of the desk to notify the staff that you have arrived and registered.   Return to the lobby to wait. You are not always called back according to the time of arrival but rather the  time your doctor will be ready.  When your beeper lights up and vibrates proceed through the double doors, under the stairs, and a member of the Outpatient Surgery staff will escort you to your preoperative room. E

## 2024-05-10 LAB
QT INTERVAL: 368 MS
QTC INTERVAL: 427 MS

## 2024-05-14 ENCOUNTER — TELEPHONE (OUTPATIENT)
Dept: UROLOGY | Facility: CLINIC | Age: 44
End: 2024-05-14
Payer: COMMERCIAL

## 2024-05-14 NOTE — TELEPHONE ENCOUNTER
Called patient to remind them to arrive at patient registration on 5/15 at 5AM for the procedure with Dr. Avila. Spoke with patient. Told patient if they had any questions to please contact our office at [phone].

## 2024-05-15 ENCOUNTER — ANESTHESIA EVENT (OUTPATIENT)
Dept: PERIOP | Facility: HOSPITAL | Age: 44
End: 2024-05-15
Payer: COMMERCIAL

## 2024-05-15 ENCOUNTER — ANESTHESIA (OUTPATIENT)
Dept: PERIOP | Facility: HOSPITAL | Age: 44
End: 2024-05-15
Payer: COMMERCIAL

## 2024-05-15 ENCOUNTER — TELEPHONE (OUTPATIENT)
Dept: UROLOGY | Facility: CLINIC | Age: 44
End: 2024-05-15
Payer: COMMERCIAL

## 2024-05-15 ENCOUNTER — HOSPITAL ENCOUNTER (OUTPATIENT)
Facility: HOSPITAL | Age: 44
Setting detail: HOSPITAL OUTPATIENT SURGERY
Discharge: HOME | End: 2024-05-15
Attending: UROLOGY | Admitting: UROLOGY
Payer: COMMERCIAL

## 2024-05-15 ENCOUNTER — APPOINTMENT (OUTPATIENT)
Dept: GENERAL RADIOLOGY | Facility: HOSPITAL | Age: 44
End: 2024-05-15
Payer: COMMERCIAL

## 2024-05-15 VITALS
WEIGHT: 296.3 LBS | HEART RATE: 82 BPM | DIASTOLIC BLOOD PRESSURE: 73 MMHG | SYSTOLIC BLOOD PRESSURE: 136 MMHG | HEIGHT: 70 IN | RESPIRATION RATE: 16 BRPM | BODY MASS INDEX: 42.42 KG/M2 | OXYGEN SATURATION: 95 % | TEMPERATURE: 98.2 F

## 2024-05-15 DIAGNOSIS — N20.0 KIDNEY STONE: Primary | ICD-10-CM

## 2024-05-15 DIAGNOSIS — N20.0 KIDNEY STONE: ICD-10-CM

## 2024-05-15 PROCEDURE — 25010000002 FENTANYL CITRATE (PF) 100 MCG/2ML SOLUTION: Performed by: NURSE ANESTHETIST, CERTIFIED REGISTERED

## 2024-05-15 PROCEDURE — 25810000003 LACTATED RINGERS PER 1000 ML: Performed by: UROLOGY

## 2024-05-15 PROCEDURE — 74018 RADEX ABDOMEN 1 VIEW: CPT

## 2024-05-15 PROCEDURE — 25010000002 LEVOFLOXACIN PER 250 MG

## 2024-05-15 PROCEDURE — 25010000002 DEXAMETHASONE PER 1 MG: Performed by: NURSE ANESTHETIST, CERTIFIED REGISTERED

## 2024-05-15 PROCEDURE — 50590 FRAGMENTING OF KIDNEY STONE: CPT | Performed by: UROLOGY

## 2024-05-15 PROCEDURE — 25010000002 ONDANSETRON PER 1 MG: Performed by: NURSE ANESTHETIST, CERTIFIED REGISTERED

## 2024-05-15 PROCEDURE — 25010000002 DEXAMETHASONE PER 1 MG: Performed by: ANESTHESIOLOGY

## 2024-05-15 PROCEDURE — 25010000002 PROPOFOL 10 MG/ML EMULSION: Performed by: NURSE ANESTHETIST, CERTIFIED REGISTERED

## 2024-05-15 RX ORDER — SODIUM CHLORIDE, SODIUM LACTATE, POTASSIUM CHLORIDE, CALCIUM CHLORIDE 600; 310; 30; 20 MG/100ML; MG/100ML; MG/100ML; MG/100ML
100 INJECTION, SOLUTION INTRAVENOUS CONTINUOUS
Status: DISCONTINUED | OUTPATIENT
Start: 2024-05-15 | End: 2024-05-15 | Stop reason: HOSPADM

## 2024-05-15 RX ORDER — SODIUM CHLORIDE, SODIUM LACTATE, POTASSIUM CHLORIDE, CALCIUM CHLORIDE 600; 310; 30; 20 MG/100ML; MG/100ML; MG/100ML; MG/100ML
1000 INJECTION, SOLUTION INTRAVENOUS CONTINUOUS
Status: DISCONTINUED | OUTPATIENT
Start: 2024-05-15 | End: 2024-05-15 | Stop reason: HOSPADM

## 2024-05-15 RX ORDER — IBUPROFEN 600 MG/1
600 TABLET ORAL EVERY 6 HOURS PRN
Status: DISCONTINUED | OUTPATIENT
Start: 2024-05-15 | End: 2024-05-15 | Stop reason: HOSPADM

## 2024-05-15 RX ORDER — ONDANSETRON 2 MG/ML
4 INJECTION INTRAMUSCULAR; INTRAVENOUS ONCE AS NEEDED
Status: DISCONTINUED | OUTPATIENT
Start: 2024-05-15 | End: 2024-05-15 | Stop reason: HOSPADM

## 2024-05-15 RX ORDER — ACETAMINOPHEN 500 MG
1000 TABLET ORAL ONCE
Status: COMPLETED | OUTPATIENT
Start: 2024-05-15 | End: 2024-05-15

## 2024-05-15 RX ORDER — SODIUM CHLORIDE 9 MG/ML
100 INJECTION, SOLUTION INTRAVENOUS CONTINUOUS
Status: DISCONTINUED | OUTPATIENT
Start: 2024-05-15 | End: 2024-05-15 | Stop reason: HOSPADM

## 2024-05-15 RX ORDER — FAMOTIDINE 10 MG/ML
20 INJECTION, SOLUTION INTRAVENOUS
Status: COMPLETED | OUTPATIENT
Start: 2024-05-15 | End: 2024-05-15

## 2024-05-15 RX ORDER — SUCCINYLCHOLINE/SOD CL,ISO/PF 200MG/10ML
SYRINGE (ML) INTRAVENOUS AS NEEDED
Status: DISCONTINUED | OUTPATIENT
Start: 2024-05-15 | End: 2024-05-15 | Stop reason: SURG

## 2024-05-15 RX ORDER — FENTANYL CITRATE 50 UG/ML
50 INJECTION, SOLUTION INTRAMUSCULAR; INTRAVENOUS
Status: DISCONTINUED | OUTPATIENT
Start: 2024-05-15 | End: 2024-05-15 | Stop reason: HOSPADM

## 2024-05-15 RX ORDER — MIDAZOLAM HYDROCHLORIDE 1 MG/ML
1 INJECTION INTRAMUSCULAR; INTRAVENOUS
Status: DISCONTINUED | OUTPATIENT
Start: 2024-05-15 | End: 2024-05-15 | Stop reason: HOSPADM

## 2024-05-15 RX ORDER — ONDANSETRON 4 MG/1
4 TABLET, ORALLY DISINTEGRATING ORAL ONCE AS NEEDED
Status: DISCONTINUED | OUTPATIENT
Start: 2024-05-15 | End: 2024-05-15 | Stop reason: HOSPADM

## 2024-05-15 RX ORDER — ONDANSETRON 2 MG/ML
INJECTION INTRAMUSCULAR; INTRAVENOUS AS NEEDED
Status: DISCONTINUED | OUTPATIENT
Start: 2024-05-15 | End: 2024-05-15 | Stop reason: SURG

## 2024-05-15 RX ORDER — HYDROCODONE BITARTRATE AND ACETAMINOPHEN 5; 325 MG/1; MG/1
1 TABLET ORAL EVERY 4 HOURS PRN
Status: DISCONTINUED | OUTPATIENT
Start: 2024-05-15 | End: 2024-05-15 | Stop reason: HOSPADM

## 2024-05-15 RX ORDER — HYDROCODONE BITARTRATE AND ACETAMINOPHEN 10; 325 MG/1; MG/1
1 TABLET ORAL EVERY 4 HOURS PRN
Status: DISCONTINUED | OUTPATIENT
Start: 2024-05-15 | End: 2024-05-15 | Stop reason: HOSPADM

## 2024-05-15 RX ORDER — PROPOFOL 10 MG/ML
VIAL (ML) INTRAVENOUS AS NEEDED
Status: DISCONTINUED | OUTPATIENT
Start: 2024-05-15 | End: 2024-05-15 | Stop reason: SURG

## 2024-05-15 RX ORDER — HYDROCODONE BITARTRATE AND ACETAMINOPHEN 5; 325 MG/1; MG/1
1 TABLET ORAL EVERY 6 HOURS PRN
Qty: 12 TABLET | Refills: 0 | Status: SHIPPED | OUTPATIENT
Start: 2024-05-15 | End: 2024-05-18

## 2024-05-15 RX ORDER — DROPERIDOL 2.5 MG/ML
0.62 INJECTION, SOLUTION INTRAMUSCULAR; INTRAVENOUS ONCE AS NEEDED
Status: DISCONTINUED | OUTPATIENT
Start: 2024-05-15 | End: 2024-05-15 | Stop reason: HOSPADM

## 2024-05-15 RX ORDER — BUPIVACAINE HCL/0.9 % NACL/PF 0.125 %
PLASTIC BAG, INJECTION (ML) EPIDURAL AS NEEDED
Status: DISCONTINUED | OUTPATIENT
Start: 2024-05-15 | End: 2024-05-15 | Stop reason: SURG

## 2024-05-15 RX ORDER — SODIUM CHLORIDE 0.9 % (FLUSH) 0.9 %
3-10 SYRINGE (ML) INJECTION AS NEEDED
Status: DISCONTINUED | OUTPATIENT
Start: 2024-05-15 | End: 2024-05-15 | Stop reason: HOSPADM

## 2024-05-15 RX ORDER — DEXAMETHASONE SODIUM PHOSPHATE 4 MG/ML
INJECTION, SOLUTION INTRA-ARTICULAR; INTRALESIONAL; INTRAMUSCULAR; INTRAVENOUS; SOFT TISSUE AS NEEDED
Status: DISCONTINUED | OUTPATIENT
Start: 2024-05-15 | End: 2024-05-15 | Stop reason: SURG

## 2024-05-15 RX ORDER — SODIUM CHLORIDE 0.9 % (FLUSH) 0.9 %
3 SYRINGE (ML) INJECTION EVERY 12 HOURS SCHEDULED
Status: DISCONTINUED | OUTPATIENT
Start: 2024-05-15 | End: 2024-05-15 | Stop reason: HOSPADM

## 2024-05-15 RX ORDER — HYDROCODONE BITARTRATE AND ACETAMINOPHEN 5; 325 MG/1; MG/1
1 TABLET ORAL ONCE AS NEEDED
Status: DISCONTINUED | OUTPATIENT
Start: 2024-05-15 | End: 2024-05-15 | Stop reason: HOSPADM

## 2024-05-15 RX ORDER — LABETALOL HYDROCHLORIDE 5 MG/ML
5 INJECTION, SOLUTION INTRAVENOUS
Status: DISCONTINUED | OUTPATIENT
Start: 2024-05-15 | End: 2024-05-15 | Stop reason: HOSPADM

## 2024-05-15 RX ORDER — DEXAMETHASONE SODIUM PHOSPHATE 4 MG/ML
4 INJECTION, SOLUTION INTRA-ARTICULAR; INTRALESIONAL; INTRAMUSCULAR; INTRAVENOUS; SOFT TISSUE ONCE AS NEEDED
Status: COMPLETED | OUTPATIENT
Start: 2024-05-15 | End: 2024-05-15

## 2024-05-15 RX ORDER — FENTANYL CITRATE 50 UG/ML
INJECTION, SOLUTION INTRAMUSCULAR; INTRAVENOUS AS NEEDED
Status: DISCONTINUED | OUTPATIENT
Start: 2024-05-15 | End: 2024-05-15 | Stop reason: SURG

## 2024-05-15 RX ORDER — ROCURONIUM BROMIDE 10 MG/ML
INJECTION, SOLUTION INTRAVENOUS AS NEEDED
Status: DISCONTINUED | OUTPATIENT
Start: 2024-05-15 | End: 2024-05-15 | Stop reason: SURG

## 2024-05-15 RX ORDER — FLUMAZENIL 0.1 MG/ML
0.2 INJECTION INTRAVENOUS AS NEEDED
Status: DISCONTINUED | OUTPATIENT
Start: 2024-05-15 | End: 2024-05-15 | Stop reason: HOSPADM

## 2024-05-15 RX ORDER — SCOLOPAMINE TRANSDERMAL SYSTEM 1 MG/1
1 PATCH, EXTENDED RELEASE TRANSDERMAL ONCE
Status: DISCONTINUED | OUTPATIENT
Start: 2024-05-15 | End: 2024-05-15 | Stop reason: HOSPADM

## 2024-05-15 RX ORDER — LEVOFLOXACIN 5 MG/ML
500 INJECTION, SOLUTION INTRAVENOUS ONCE
Status: COMPLETED | OUTPATIENT
Start: 2024-05-15 | End: 2024-05-15

## 2024-05-15 RX ORDER — SODIUM CHLORIDE 0.9 % (FLUSH) 0.9 %
3 SYRINGE (ML) INJECTION AS NEEDED
Status: DISCONTINUED | OUTPATIENT
Start: 2024-05-15 | End: 2024-05-15 | Stop reason: HOSPADM

## 2024-05-15 RX ORDER — LIDOCAINE HYDROCHLORIDE 10 MG/ML
0.5 INJECTION, SOLUTION EPIDURAL; INFILTRATION; INTRACAUDAL; PERINEURAL ONCE AS NEEDED
Status: DISCONTINUED | OUTPATIENT
Start: 2024-05-15 | End: 2024-05-15 | Stop reason: HOSPADM

## 2024-05-15 RX ORDER — SODIUM CHLORIDE 9 MG/ML
40 INJECTION, SOLUTION INTRAVENOUS AS NEEDED
Status: DISCONTINUED | OUTPATIENT
Start: 2024-05-15 | End: 2024-05-15 | Stop reason: HOSPADM

## 2024-05-15 RX ORDER — NALOXONE HCL 0.4 MG/ML
0.4 VIAL (ML) INJECTION AS NEEDED
Status: DISCONTINUED | OUTPATIENT
Start: 2024-05-15 | End: 2024-05-15 | Stop reason: HOSPADM

## 2024-05-15 RX ORDER — LIDOCAINE HYDROCHLORIDE 20 MG/ML
INJECTION, SOLUTION EPIDURAL; INFILTRATION; INTRACAUDAL; PERINEURAL AS NEEDED
Status: DISCONTINUED | OUTPATIENT
Start: 2024-05-15 | End: 2024-05-15 | Stop reason: SURG

## 2024-05-15 RX ORDER — LIDOCAINE HYDROCHLORIDE 40 MG/ML
SOLUTION TOPICAL AS NEEDED
Status: DISCONTINUED | OUTPATIENT
Start: 2024-05-15 | End: 2024-05-15 | Stop reason: SURG

## 2024-05-15 RX ADMIN — LIDOCAINE HYDROCHLORIDE 100 MG: 20 INJECTION, SOLUTION EPIDURAL; INFILTRATION; INTRACAUDAL; PERINEURAL at 07:26

## 2024-05-15 RX ADMIN — PROPOFOL 200 MG: 10 INJECTION, EMULSION INTRAVENOUS at 07:26

## 2024-05-15 RX ADMIN — Medication 180 MG: at 07:26

## 2024-05-15 RX ADMIN — LIDOCAINE HYDROCHLORIDE 1 EACH: 40 SOLUTION TOPICAL at 07:27

## 2024-05-15 RX ADMIN — ACETAMINOPHEN 1000 MG: 500 TABLET, FILM COATED ORAL at 06:36

## 2024-05-15 RX ADMIN — ONDANSETRON 4 MG: 2 INJECTION INTRAMUSCULAR; INTRAVENOUS at 07:34

## 2024-05-15 RX ADMIN — Medication 100 MCG: at 07:32

## 2024-05-15 RX ADMIN — Medication 200 MCG: at 07:45

## 2024-05-15 RX ADMIN — FENTANYL CITRATE 100 MCG: 50 INJECTION, SOLUTION INTRAMUSCULAR; INTRAVENOUS at 07:23

## 2024-05-15 RX ADMIN — ROCURONIUM 5 MG: 50 INJECTION, SOLUTION INTRAVENOUS at 07:26

## 2024-05-15 RX ADMIN — DEXAMETHASONE SODIUM PHOSPHATE 4 MG: 4 INJECTION, SOLUTION INTRAMUSCULAR; INTRAVENOUS at 07:34

## 2024-05-15 RX ADMIN — IBUPROFEN 600 MG: 600 TABLET, FILM COATED ORAL at 09:15

## 2024-05-15 RX ADMIN — Medication 100 MCG: at 08:01

## 2024-05-15 RX ADMIN — SCOPALAMINE 1 PATCH: 1 PATCH, EXTENDED RELEASE TRANSDERMAL at 06:36

## 2024-05-15 RX ADMIN — SODIUM CHLORIDE, POTASSIUM CHLORIDE, SODIUM LACTATE AND CALCIUM CHLORIDE 1000 ML: 600; 310; 30; 20 INJECTION, SOLUTION INTRAVENOUS at 05:57

## 2024-05-15 RX ADMIN — DEXAMETHASONE SODIUM PHOSPHATE 4 MG: 4 INJECTION INTRA-ARTICULAR; INTRALESIONAL; INTRAMUSCULAR; INTRAVENOUS; SOFT TISSUE at 06:36

## 2024-05-15 RX ADMIN — FAMOTIDINE 20 MG: 10 INJECTION INTRAVENOUS at 06:36

## 2024-05-15 RX ADMIN — LEVOFLOXACIN 500 MG: 5 INJECTION, SOLUTION INTRAVENOUS at 06:33

## 2024-05-15 RX ADMIN — SODIUM CHLORIDE, POTASSIUM CHLORIDE, SODIUM LACTATE AND CALCIUM CHLORIDE: 600; 310; 30; 20 INJECTION, SOLUTION INTRAVENOUS at 08:01

## 2024-05-15 NOTE — ANESTHESIA PREPROCEDURE EVALUATION
Anesthesia Evaluation     NPO Solid Status: > 8 hours  NPO Liquid Status: > 8 hours           Airway   Mallampati: I  No difficulty expected  Dental      Pulmonary    (+) ,sleep apnea on CPAP  (-) not a smoker  Cardiovascular   Exercise tolerance: good (4-7 METS)    (+) hypertension  (-) CAD      Neuro/Psych  (-) seizures, TIA, CVA  GI/Hepatic/Renal/Endo    (+) morbid obesity, GERD, renal disease- stones  (-) diabetes    Musculoskeletal     Abdominal    Substance History      OB/GYN          Other                      Anesthesia Plan    ASA 3     general     (Concerned about ponv, will order pretx)  intravenous induction     Anesthetic plan, risks, benefits, and alternatives have been provided, discussed and informed consent has been obtained with: patient.    CODE STATUS:

## 2024-05-15 NOTE — ANESTHESIA PROCEDURE NOTES
Airway  Urgency: elective    Date/Time: 5/15/2024 7:27 AM  Airway not difficult    General Information and Staff    Patient location during procedure: OR  CRNA/CAA: Vannesa Mancera CRNA    Indications and Patient Condition  Indications for airway management: airway protection    Preoxygenated: yes  Mask difficulty assessment: 0 - not attempted    Final Airway Details  Final airway type: endotracheal airway      Successful airway: ETT  Cuffed: yes   Successful intubation technique: direct laryngoscopy, video laryngoscopy and RSI  Facilitating devices/methods: intubating stylet  Endotracheal tube insertion site: oral  Blade: Azevedo  Blade size: 4  ETT size (mm): 7.5  Cormack-Lehane Classification: grade I - full view of glottis  Placement verified by: chest auscultation and capnometry   Cuff volume (mL): 5  Measured from: lips  ETT/EBT  to lips (cm): 23  Number of attempts at approach: 1  Assessment: lips, teeth, and gum same as pre-op and atraumatic intubation

## 2024-05-15 NOTE — TELEPHONE ENCOUNTER
Called patient and scheduled him for a 1 month post-op follow-up with Miley Dill for 6/14/24 at 1:45pm.  I instructed the patient to have a KUB xray prior to the appointment.  He questioned this saying he thought it would be a renal ultrasound.  I checked Dr. Avila's note, and it indeed said KUB, but I told him I would double-check this just to be sure.  Just plan on doing KUB unless you here from us.  Patient agreed.

## 2024-05-15 NOTE — INTERVAL H&P NOTE
H&P updated. The patient was examined and the following changes are noted:  I looked at his KUB today.  The stone on the right side is very difficult to see.  Of note the radiologist has marked this on both this film and the 1 from a couple weeks ago.  This is confirmed to be where the stone is located on the CT done at [facility].  The patient also has 3 stones in the left kidney.  After discussion with him were going to simulate first whether or not we can see the right-sided stone.  He is asymptomatic from this.  He does want to be treated because the stone has been there for a while and causing obstruction on the CT.  If we see the stone we will proceed with right-sided ESWL today.  If we do not see the stone under C arm fluoroscopy he wants me to go ahead and treat the left side since there are 3 stones present there.

## 2024-05-15 NOTE — ANESTHESIA POSTPROCEDURE EVALUATION
"Patient: Micah Spencer Jr.    Procedure Summary       Date: 05/15/24 Room / Location: [facility] OR  /  [facility] OR    Anesthesia Start: 0722 Anesthesia Stop: 0826    Procedure: EXTRACORPOREAL SHOCKWAVE LITHOTRIPSY-right (Right) Diagnosis:       Kidney stone      (Kidney stone [N20.0])    Surgeons: Mt Avila MD Provider: Vannesa Mancera CRNA    Anesthesia Type: general ASA Status: 3            Anesthesia Type: general    Vitals  Vitals Value Taken Time   /73 05/15/24 0915   Temp 98.2 °F (36.8 °C) 05/15/24 0915   Pulse 91 05/15/24 0916   Resp 14 05/15/24 0915   SpO2 96 % 05/15/24 0916   Vitals shown include unfiled device data.        Post Anesthesia Care and Evaluation    Patient location during evaluation: PHASE II  Patient participation: complete - patient participated  Level of consciousness: awake and awake and alert  Pain score: 0  Pain management: adequate    Airway patency: patent  Anesthetic complications: No anesthetic complications  PONV Status: none  Cardiovascular status: acceptable  Respiratory status: acceptable  Hydration status: acceptable    Comments: Patient discharged according to acceptable Frannie score per RN assessment. See nursing records for further information.     Blood pressure 132/79, pulse 94, temperature 98.2 °F (36.8 °C), temperature source Temporal, resp. rate 16, height 178 cm (70.08\"), weight 134 kg (296 lb 4.8 oz), SpO2 93%.      "

## 2024-05-15 NOTE — OP NOTE
OP NOTE  Micah Spencer Jr.    Date of Procedure: 5/15/2024    Pre-op Diagnosis:   Kidney stone [N20.0]    Post-op Diagnosis:     Post-Op Diagnosis Codes:     * Kidney stone [N20.0]    Procedure/CPT® Codes:      Procedure(s):  EXTRACORPOREAL SHOCKWAVE LITHOTRIPSY-right    Surgeon(s):  Mt Avila MD    Anesthesia: General    Staff:   Circulator: Cari Snyder RN  Scrub Person: Ken Cowart; Lashaun Francois    Indications:   RIGHT UPJ stone with obstruction.  We were able to see the stone under fluoroscopy.  After discussion of options, patient has given informed consent to undergo right ESWL.  All risks, benefits, and alternatives were discussed.    Operative Report: The patient is identified. The KUB is reviewed. After answering any questions, patient was brought to the operating room and placed on the patient table of the [facility].  General endotracheal anesthesia was administered.  Preoperative KUB was reviewed.   An [facility] c-arm fluoroscope was used to identify the stone.    The shock-head is brought into position.  This stone is brought into the F2 plane for focus.  Treatment was initiated.  We gradually increased the energy level from 1 to 7.  This stone was treated at a rate of 90.  We paused for 2 minutes after 300 shocks to reduce the risk of renal damage.  The stone was periodically checked to make sure that we were on it and getting good breakup.  We checked at 700, 1000, 1500, 2000, and 2500 shocks.  The stone did appear to have good breakup.  I felt it was unnecessary to place a ureteral stent.  At the conclusion of 3000 shocks, the patient was awakened from anesthesia and transferred to the recovery room in satisfactory condition.      Estimated Blood Loss: <30 mL    Specimens:                None      Drains: None  Complications: none  Plan: Repeat KUB in a few weeks. Home today if no evidence of infection or significant bleeding.     Mt Avila MD     Date: 5/15/2024  Time: 08:37  CDT

## 2024-05-15 NOTE — TELEPHONE ENCOUNTER
Jesse received call from Sandra at [facility] OP and warm transferred to office.  Patient is needing a 1 month post-op follow-up with Dr. Avila, with a KUB xray prior.  There is no availability so I told them to tell outpatient that we will call the patient with appointment information after I've spoken to Dr. Avila's MA.  [facility] forwarded message to Sandra.

## 2024-05-16 ENCOUNTER — TELEPHONE (OUTPATIENT)
Dept: UROLOGY | Facility: CLINIC | Age: 44
End: 2024-05-16
Payer: COMMERCIAL

## 2024-05-16 NOTE — TELEPHONE ENCOUNTER
Called pt. To check on them post-operatively.  No answer, left message. Advised pt. To call us with any questions or concerns. Ok for the HUB to read if pt. Calls back.

## 2024-05-22 ENCOUNTER — TELEPHONE (OUTPATIENT)
Dept: HEMATOLOGY | Age: 44
End: 2024-05-22

## 2024-05-28 NOTE — PROGRESS NOTES
Progress Note      Pt Name: Oren Carrillo  YOB: 1980  MRN: [id number]    Date of evaluation: 5/29/2024  History Obtained From:  patient, electronic medical record    CHIEF COMPLAINT:    Chief Complaint   Patient presents with    Follow-up     Iron deficiency anemia, nephrolithiasis, leukocytosis     Current active problems  Iron deficiency anemia  Nephrolithiasis      HISTORY OF PRESENT ILLNESS:    Oren SERRANO) is a 44 y.o.  male seen initially in the office on 2/26/2024 for an abnormal finding on lumbar MRI that was suspicious for red marrow or possible underlying marrow infiltrative process.  Workup has been unrevealing.  He was noted to have microcytic indices with serology consistent with iron deficiency.  He has significant nephrolithiasis and is status post lithotripsy 5/15/2024 by Dr. Henderson.  He was noted to be anemic at the time of his lithotripsy.  He has not required a transfusion.  He is tolerating 1 iron daily at present.  He has also been having issues with mild leukocytosis.    He does have a small pancreatic cyst that is being monitored with serial imaging.  He had his most recent scan in April.    CT imaging has revealed a fatty liver.  He does have essential hypertension and is on lisinopril 40 mg daily, HCTZ 12.5 daily.  He is back on tamsulosin 0.4 as needed -this was scheduled when he had his issue with his nephrolithiasis..    HEMATOLOGY HISTORY: Iron deficiency anemia-suspect related to hematuria from recurrent nephrolithiasis  Ion was seen in initial hematology consultation on 2/26/2024 referred from [facility] for evaluation of abnormal finding on lumbar MRI.      Ion developed issues with bilateral hip and lower back pain.  Workup led to imaging that included MRI of the lumbar spine.  He reports that he has had intermittent elevated white count.  He denies any history of splenectomy.  He denies being on steroids at present.     He does have a

## 2024-05-29 ENCOUNTER — OFFICE VISIT (OUTPATIENT)
Dept: HEMATOLOGY | Age: 44
End: 2024-05-29
Payer: COMMERCIAL

## 2024-05-29 ENCOUNTER — HOSPITAL ENCOUNTER (OUTPATIENT)
Dept: INFUSION THERAPY | Age: 44
Discharge: HOME | End: 2024-05-29
Payer: COMMERCIAL

## 2024-05-29 VITALS
DIASTOLIC BLOOD PRESSURE: 70 MMHG | SYSTOLIC BLOOD PRESSURE: 122 MMHG | WEIGHT: 291 LBS | HEIGHT: 70 IN | OXYGEN SATURATION: 96 % | HEART RATE: 70 BPM | BODY MASS INDEX: 41.66 KG/M2 | TEMPERATURE: 98.1 F

## 2024-05-29 DIAGNOSIS — K86.2 PANCREATIC CYST: ICD-10-CM

## 2024-05-29 DIAGNOSIS — R71.8 MICROCYTOSIS: ICD-10-CM

## 2024-05-29 DIAGNOSIS — D72.829 LEUKOCYTOSIS, UNSPECIFIED TYPE: ICD-10-CM

## 2024-05-29 DIAGNOSIS — N20.0 NEPHROLITHIASIS: ICD-10-CM

## 2024-05-29 DIAGNOSIS — R93.7 ABNORMAL MRI, LUMBAR SPINE: Primary | ICD-10-CM

## 2024-05-29 LAB
BASOPHILS # BLD: 0.11 K/UL (ref 0.01–0.08)
BASOPHILS NFR BLD: 1 % (ref 0.1–1.2)
EOSINOPHIL # BLD: 0.2 K/UL (ref 0.04–0.54)
EOSINOPHIL NFR BLD: 1.9 % (ref 0.7–7)
ERYTHROCYTE [DISTWIDTH] IN BLOOD BY AUTOMATED COUNT: 13.3 % (ref 11.6–14.4)
HCT VFR BLD AUTO: 44 % (ref 40.1–51)
HGB BLD-MCNC: 14.8 G/DL (ref 13.7–17.5)
LYMPHOCYTES # BLD: 1.88 K/UL (ref 1.18–3.74)
LYMPHOCYTES NFR BLD: 17.8 % (ref 19.3–53.1)
MCH RBC QN AUTO: 26.8 PG (ref 25.7–32.2)
MCHC RBC AUTO-ENTMCNC: 33.6 G/DL (ref 32.3–36.5)
MCV RBC AUTO: 79.6 FL (ref 79–92.2)
MONOCYTES # BLD: 0.58 K/UL (ref 0.24–0.82)
MONOCYTES NFR BLD: 5.5 % (ref 4.7–12.5)
NEUTROPHILS # BLD: 7.77 K/UL (ref 1.56–6.13)
NEUTS SEG NFR BLD: 73.6 % (ref 34–71.1)
PLATELET # BLD AUTO: 283 K/UL (ref 163–337)
PMV BLD AUTO: 9.7 FL (ref 7.4–10.4)
RBC # BLD AUTO: 5.53 M/UL (ref 4.63–6.08)
WBC # BLD AUTO: 10.56 K/UL (ref 4.23–9.07)

## 2024-05-29 PROCEDURE — 85025 COMPLETE CBC W/AUTO DIFF WBC: CPT

## 2024-05-29 PROCEDURE — 36415 COLL VENOUS BLD VENIPUNCTURE: CPT

## 2024-05-29 PROCEDURE — 99213 OFFICE O/P EST LOW 20 MIN: CPT | Performed by: PHYSICIAN ASSISTANT

## 2024-05-29 PROCEDURE — 99212 OFFICE O/P EST SF 10 MIN: CPT

## 2024-05-29 PROCEDURE — 36415 COLL VENOUS BLD VENIPUNCTURE: CPT | Performed by: PHYSICIAN ASSISTANT

## 2024-05-29 ASSESSMENT — ENCOUNTER SYMPTOMS
SORE THROAT: 0
COLOR CHANGE: 0
VOMITING: 0
PHOTOPHOBIA: 0
COUGH: 0
EYE ITCHING: 0
EYE DISCHARGE: 0
TROUBLE SWALLOWING: 0
CONSTIPATION: 0
WHEEZING: 0
BLOOD IN STOOL: 0
ABDOMINAL PAIN: 0
BACK PAIN: 1
SHORTNESS OF BREATH: 0
DIARRHEA: 0
VOICE CHANGE: 0
ABDOMINAL DISTENTION: 0
NAUSEA: 0

## 2024-05-31 NOTE — PROGRESS NOTES
Subjective    Mr. Spencer is 44 y.o. male    Chief Complaint: Follow-up with KUB prior s/p right ESWL    History of Present Illness    44-year-old male established patient in for follow-up to review KUB s/p right ESWL due 5/15/2024 Dr. Avila to a 4 mm proximal ureteral stone.    patient underwent CT abdomen and pelvis [facility] due to a follow-up on a pancreatic cyst, leading to the incidental finding of the ureteral stone.  Patient opted to continue to try expulsive therapy as patient had previously passed a 5 mm stone right proximal ureter 11/2023, ureteral stone had failed to pass.  Postoperatively recovered well.  Reports mild pain for the first couple days.  Not requiring pain medication.  Blood noted in urine x 3 days.     Patient with known remaining stones bilaterally.  History of calcium oxalate stone disease.  Last surgical intervention 7 mm left proximal ureteral stone ESWL April 2023 Dr. Avila.     Is currently followed by Dr. Cano's office where patient is undergoing multiple labs.  Experiencing chronic anemia.  Reports they are currently concern for possible CML.    The following portions of the patient's history were reviewed and updated as appropriate: allergies, current medications, past family history, past medical history, past social history, past surgical history and problem list.    Review of Systems   Constitutional:  Negative for chills and fever.   Gastrointestinal:  Negative for abdominal pain, anal bleeding, blood in stool, nausea and vomiting.   Genitourinary:  Negative for dysuria, flank pain and hematuria.         Current Outpatient Medications:     aluminum hydroxide-mag carbonate (GAVISCON EXTRA RELIEF) 160-105 MG chewable tablet chewable tablet, Chew At Night As Needed., Disp: , Rfl:     calcium carbonate (TUMS) 500 MG chewable tablet, Chew 1 tablet Daily., Disp: , Rfl:     cholecalciferol (VITAMIN D3) 25 MCG (1000 UT) tablet, Take 1 tablet by mouth Daily.,  Disp: , Rfl:     econazole nitrate (SPECTAZOLE) 1 % cream, 1 Application As Needed., Disp: , Rfl:     ferrous sulfate 325 (65 FE) MG tablet, Take 1 tablet by mouth Daily With Breakfast., Disp: , Rfl:     fluticasone (FLONASE) 50 MCG/ACT nasal spray, 2 sprays into the nostril(s) as directed by provider 2 (Two) Times a Day., Disp: 48 g, Rfl: 3    hydroCHLOROthiazide (HYDRODIURIL) 12.5 MG tablet, Take 1 tablet by mouth Every Morning., Disp: , Rfl:     ibuprofen (ADVIL,MOTRIN) 200 MG tablet, Take 1 tablet by mouth Every 6 (Six) Hours As Needed for Mild Pain., Disp: , Rfl:     lisinopril (PRINIVIL,ZESTRIL) 40 MG tablet, Take 1 tablet by mouth Daily., Disp: , Rfl:     naloxone (NARCAN) 4 MG/0.1ML nasal spray, CALL 911. Do not prime. Spray into nostril upon signs of opioid overdose. May repeat in 2 to 3 minutes in opposite nostril if no or minimal breathing and responsiveness, then as needed (if doses are available) every 2 to 3 minutes., Disp: 2 each, Rfl: 0    ondansetron (ZOFRAN) 8 MG tablet, Take 1 tablet by mouth As Needed for Nausea., Disp: , Rfl:     ondansetron ODT (ZOFRAN-ODT) 4 MG disintegrating tablet, Place 1 tablet on the tongue Every 4 (Four) Hours As Needed for Nausea or Vomiting., Disp: 10 tablet, Rfl: 0    HYDROcodone-acetaminophen (NORCO)  MG per tablet, Take 1 tablet by mouth Every 8 (Eight) Hours As Needed. (Patient not taking: Reported on 6/14/2024), Disp: , Rfl:     ketorolac (TORADOL) 10 MG tablet, Take 1 tablet by mouth Every 6 (Six) Hours As Needed for Moderate Pain. (Patient not taking: Reported on 6/14/2024), Disp: 15 tablet, Rfl: 0    tamsulosin (FLOMAX) 0.4 MG capsule 24 hr capsule, Take 1 capsule by mouth 2 (Two) Times a Day. (Patient not taking: Reported on 6/14/2024), Disp: 30 capsule, Rfl: 0    Past Medical History:   Diagnosis Date    Bell's palsy     GERD (gastroesophageal reflux disease)     Hypertension     Kidney stone     Pancreatic cyst     Sleep apnea        Past Surgical  "History:   Procedure Laterality Date    EXTRACORPOREAL SHOCK WAVE LITHOTRIPSY (ESWL) Left 4/14/2023    Procedure: EXTRACORPOREAL SHOCKWAVE LITHOTRIPSY-left;  Surgeon: Mt Avila MD;  Location:  [facility];  Service: Urology;  Laterality: Left;    EXTRACORPOREAL SHOCK WAVE LITHOTRIPSY (ESWL) Right 5/15/2024    Procedure: EXTRACORPOREAL SHOCKWAVE LITHOTRIPSY-right;  Surgeon: Mt Avila MD;  Location:  [facility];  Service: Urology;  Laterality: Right;       Social History     Socioeconomic History    Marital status:    Tobacco Use    Smoking status: Never    Smokeless tobacco: Never   Vaping Use    Vaping status: Never Used   Substance and Sexual Activity    Alcohol use: Never    Drug use: Never    Sexual activity: Defer       Family History   Problem Relation Age of Onset    Diabetes type II Father     Heart disease Father     Diabetes type II Maternal Grandmother        Objective    Temp 97.5 °F (36.4 °C)   Ht 178 cm (70.08\")   Wt 134 kg (296 lb 6.4 oz)   BMI 42.43 kg/m²     Physical Exam  Constitutional:       Appearance: Normal appearance.   Abdominal:      Tenderness: There is no right CVA tenderness or left CVA tenderness.   Skin:     General: Skin is warm and dry.   Neurological:      Mental Status: He is alert and oriented to person, place, and time.   Psychiatric:         Mood and Affect: Mood normal.         Behavior: Behavior normal.             Results for orders placed or performed in visit on 06/14/24   POC Urinalysis Dipstick, Multipro    Specimen: Urine   Result Value Ref Range    Color Yellow Yellow, Straw, Dark Yellow, Vesna    Clarity, UA Clear Clear    Glucose, UA Negative Negative mg/dL    Bilirubin Negative Negative    Ketones, UA Negative Negative    Specific Gravity  1.020 1.005 - 1.030    Blood, UA Trace (A) Negative    pH, Urine 7.0 5.0 - 8.0    Protein, POC Negative Negative mg/dL    Urobilinogen, UA 0.2 E.U./dL Normal, 0.2 E.U./dL    Nitrite, UA Negative Negative    " Leukocytes Negative Negative   KUB independent review    A KUB is available for me to review today.  The image is inspected for a bowel gas pattern and the general bone structure of the spine and pelvis. The kidneys are then inspected closely.  Renal outline is noted if identifiable. The kidney, collecting system, and anticipated path of the ureter are examined for calcifications including those in the true pelvis.  This film reveals:    On the right there are no calcificaitons seen in the kidney or the expected course of the ureter. .    On the left there are multiple renal stones. .    Assessment and Plan    Diagnoses and all orders for this visit:    1. Kidney stone (Primary)  -     POC Urinalysis Dipstick, Multipro  -     [facility] 24Hr Urine Panel -; Future  -     XR Abdomen KUB; Future      KUB today right ureteral stone no longer visualized.  Patient with remaining known left-sided stone burden.  Patient interested in ESWL treatment left side.  Would like to follow-up in 3 months.    Will place orders for 24-hour urine metabolic evaluation as no prior metabolic panel completed

## 2024-06-13 ENCOUNTER — TELEPHONE (OUTPATIENT)
Dept: UROLOGY | Facility: CLINIC | Age: 44
End: 2024-06-13
Payer: COMMERCIAL

## 2024-06-14 ENCOUNTER — HOSPITAL ENCOUNTER (OUTPATIENT)
Dept: GENERAL RADIOLOGY | Facility: HOSPITAL | Age: 44
Discharge: HOME | End: 2024-06-14
Payer: COMMERCIAL

## 2024-06-14 ENCOUNTER — OFFICE VISIT (OUTPATIENT)
Dept: UROLOGY | Facility: CLINIC | Age: 44
End: 2024-06-14
Payer: COMMERCIAL

## 2024-06-14 VITALS — HEIGHT: 70 IN | WEIGHT: 296.4 LBS | BODY MASS INDEX: 42.43 KG/M2 | TEMPERATURE: 97.5 F

## 2024-06-14 DIAGNOSIS — N20.0 KIDNEY STONE: Primary | ICD-10-CM

## 2024-06-14 DIAGNOSIS — N20.0 KIDNEY STONE: ICD-10-CM

## 2024-06-14 PROCEDURE — 74018 RADEX ABDOMEN 1 VIEW: CPT

## 2024-09-03 ENCOUNTER — LAB (OUTPATIENT)
Dept: LAB | Facility: HOSPITAL | Age: 44
End: 2024-09-03
Payer: COMMERCIAL

## 2024-09-03 DIAGNOSIS — N20.0 KIDNEY STONE: ICD-10-CM

## 2024-09-03 PROCEDURE — 83986 ASSAY PH BODY FLUID NOS: CPT

## 2024-09-03 PROCEDURE — 84133 ASSAY OF URINE POTASSIUM: CPT

## 2024-09-03 PROCEDURE — 84392 ASSAY OF URINE SULFATE: CPT

## 2024-09-03 PROCEDURE — 82570 ASSAY OF URINE CREATININE: CPT

## 2024-09-03 PROCEDURE — 82140 ASSAY OF AMMONIA: CPT

## 2024-09-03 PROCEDURE — 84105 ASSAY OF URINE PHOSPHORUS: CPT

## 2024-09-03 PROCEDURE — 82340 ASSAY OF CALCIUM IN URINE: CPT

## 2024-09-03 PROCEDURE — 83945 ASSAY OF OXALATE: CPT

## 2024-09-03 PROCEDURE — 82615 TEST FOR URINE CYSTINES: CPT

## 2024-09-03 PROCEDURE — 82436 ASSAY OF URINE CHLORIDE: CPT

## 2024-09-03 PROCEDURE — 84560 ASSAY OF URINE/URIC ACID: CPT

## 2024-09-03 PROCEDURE — 83735 ASSAY OF MAGNESIUM: CPT

## 2024-09-03 PROCEDURE — 84300 ASSAY OF URINE SODIUM: CPT

## 2024-09-03 PROCEDURE — 82507 ASSAY OF CITRATE: CPT

## 2024-09-03 PROCEDURE — 84540 ASSAY OF URINE/UREA-N: CPT

## 2024-09-10 LAB
AMMONIA 24H UR-SRATE: 29 MMOL/24 HR (ref 15–60)
CA H2 PHOS DIHYD 24H SATFR UR: 1.42 (ref 0.5–2)
CALCIUM 24H UR-MRATE: 155 MG/24 HR
CALCIUM/CREAT 24H UR: 65 MG/G CREAT (ref 34–196)
CALCIUM/KG BODY WEIGHT: 1.2 MG/24 HR/KG
CAOX INDEX 24H UR-RTO: 5.07 (ref 6–10)
CHLORIDE 24H UR-SRATE: 247 MMOL/24 HR (ref 70–250)
CITRATE 24H UR-MRATE: 305 MG/24 HR
CREAT 24H UR-MRATE: 2390 MG/24 HR
CREAT/BW 24H UR-RELMRAT: 18.2 MG/24 HR/KG (ref 11.9–24.4)
CYSTINE 24H UR QL: ABNORMAL
LABORATORY COMMENT REPORT: ABNORMAL
MAGNESIUM 24H UR-MRATE: 85 MG/24 HR (ref 30–120)
OXALATE 24H UR-MRATE: 36 MG/24 HR (ref 20–40)
PH 24H UR: 6.25 [PH] (ref 5.8–6.2)
PHOSPHATE 24H UR-MRATE: 1248 MG/24 HR (ref 600–1200)
POTASSIUM 24H UR-SRATE: 99 MMOL/24 HR (ref 20–100)
PROTEIN CATABOLIC RATE 24H UR-MRATE: 0.8 G/KG/24 HR (ref 0.8–1.4)
SODIUM 24H UR-SRATE: 240 MMOL/24 HR (ref 50–150)
SPECIMEN VOL 24H UR: 1610 ML/24 HR (ref 500–4000)
SULFATE 24H UR-SRATE: 51 MEQ/24 HR (ref 20–80)
URATE 24H SATFR UR: 0.68
URATE 24H UR-MRATE: 911 MG/24 HR
UUN 24H UR-MRATE: 12.26 G/24 HR (ref 6–14)

## 2024-09-11 DIAGNOSIS — N20.0 NEPHROLITHIASIS: Primary | ICD-10-CM

## 2024-09-12 NOTE — H&P (VIEW-ONLY)
Subjective    Mr. Spencer is 44 y.o. male    Chief Complaint: In to review 24-hour urine metabolic evaluation results    History of Present Illness    44-year-old male established patient in to review 24-hour urine metabolic evaluation results as patient has an extensive history of kidney stones last of which requiring surgical intervention right ESWL 5/2024 Dr. Avila due to a 4 mm right proximal ureteral stone that failed to pass.    Patient now with report over the last 24 hours of new onset mild left-sided flank pain.  Patient currently worried that a another stone may now be within the ureter.      History of calcium oxalate stone disease.     Is currently followed by Dr. Cano's office where patient is undergoing multiple labs.  Experiencing chronic anemia.  Reports they are currently concern for possible CML.  Currently awaiting getting scheduled for lymph node biopsy with Dr. Kee.    The following portions of the patient's history were reviewed and updated as appropriate: allergies, current medications, past family history, past medical history, past social history, past surgical history and problem list.    Review of Systems   Constitutional:  Negative for chills and fever.   Gastrointestinal:  Negative for abdominal pain, anal bleeding and blood in stool.   Genitourinary:  Positive for flank pain. Negative for dysuria and hematuria.   Musculoskeletal:  Positive for back pain.         Current Outpatient Medications:     aluminum hydroxide-mag carbonate (GAVISCON EXTRA RELIEF) 160-105 MG chewable tablet chewable tablet, Chew At Night As Needed., Disp: , Rfl:     calcium carbonate (TUMS) 500 MG chewable tablet, Chew 1 tablet Daily., Disp: , Rfl:     cholecalciferol (VITAMIN D3) 25 MCG (1000 UT) tablet, Take 1 tablet by mouth Daily., Disp: , Rfl:     econazole nitrate (SPECTAZOLE) 1 % cream, 1 Application As Needed., Disp: , Rfl:     ferrous sulfate 325 (65 FE) MG tablet, Take 1 tablet by mouth Daily  With Breakfast., Disp: , Rfl:     fluticasone (FLONASE) 50 MCG/ACT nasal spray, 2 sprays into the nostril(s) as directed by provider 2 (Two) Times a Day., Disp: 48 g, Rfl: 3    hydroCHLOROthiazide (HYDRODIURIL) 12.5 MG tablet, Take 1 tablet by mouth Every Morning., Disp: , Rfl:     ibuprofen (ADVIL,MOTRIN) 200 MG tablet, Take 1 tablet by mouth Every 6 (Six) Hours As Needed for Mild Pain., Disp: , Rfl:     lisinopril (PRINIVIL,ZESTRIL) 40 MG tablet, Take 1 tablet by mouth Daily., Disp: , Rfl:     ondansetron (ZOFRAN) 8 MG tablet, Take 1 tablet by mouth As Needed for Nausea., Disp: , Rfl:     ondansetron ODT (ZOFRAN-ODT) 4 MG disintegrating tablet, Place 1 tablet on the tongue Every 4 (Four) Hours As Needed for Nausea or Vomiting., Disp: 10 tablet, Rfl: 0    tamsulosin (FLOMAX) 0.4 MG capsule 24 hr capsule, Take 1 capsule by mouth 2 (Two) Times a Day., Disp: 30 capsule, Rfl: 0    allopurinol (Zyloprim) 100 MG tablet, Take 1 tablet by mouth Daily., Disp: 30 tablet, Rfl: 11    HYDROcodone-acetaminophen (NORCO)  MG per tablet, Take 1 tablet by mouth Every 8 (Eight) Hours As Needed. (Patient not taking: Reported on 6/14/2024), Disp: , Rfl:     ketorolac (TORADOL) 10 MG tablet, Take 1 tablet by mouth Every 6 (Six) Hours As Needed for Moderate Pain. (Patient not taking: Reported on 6/14/2024), Disp: 15 tablet, Rfl: 0    Past Medical History:   Diagnosis Date    Bell's palsy     Disease of thyroid gland 2017    GERD (gastroesophageal reflux disease)     Hypertension     Kidney stone     Pancreatic cyst     Sleep apnea     TMJ dysfunction 2015       Past Surgical History:   Procedure Laterality Date    EXTRACORPOREAL SHOCK WAVE LITHOTRIPSY (ESWL) Left 04/14/2023    Procedure: EXTRACORPOREAL SHOCKWAVE LITHOTRIPSY-left;  Surgeon: Mt Avila MD;  Location: [facility];  Service: Urology;  Laterality: Left;    EXTRACORPOREAL SHOCK WAVE LITHOTRIPSY (ESWL) Right 05/15/2024    Procedure: EXTRACORPOREAL SHOCKWAVE  "LITHOTRIPSY-right;  Surgeon: Mt Avila MD;  Location: [facility];  Service: Urology;  Laterality: Right;       Social History     Socioeconomic History    Marital status:    Tobacco Use    Smoking status: Never    Smokeless tobacco: Never   Vaping Use    Vaping status: Never Used   Substance and Sexual Activity    Alcohol use: Never    Drug use: Never    Sexual activity: Defer       Family History   Problem Relation Age of Onset    Diabetes type II Father     Heart disease Father     Diabetes Father     Hypertension Father     Hyperlipidemia Father     Diabetes type II Maternal Grandmother     Diabetes Maternal Grandmother        Objective    Temp 97.7 °F (36.5 °C)   Ht 178 cm (70.08\")   Wt 131 kg (288 lb)   BMI 41.23 kg/m²     Physical Exam  Constitutional:       Appearance: Normal appearance.   Abdominal:      Tenderness: There is no right CVA tenderness or left CVA tenderness.   Skin:     General: Skin is warm and dry.   Neurological:      Mental Status: He is alert and oriented to person, place, and time.   Psychiatric:         Mood and Affect: Mood normal.         Behavior: Behavior normal.             Results for orders placed or performed in visit on 09/20/24   POC Urinalysis Dipstick, Multipro    Specimen: Urine   Result Value Ref Range    Color Yellow Yellow, Straw, Dark Yellow, Vesna    Clarity, UA Clear Clear    Glucose, UA Negative Negative mg/dL    Bilirubin Negative Negative    Ketones, UA Negative Negative    Specific Gravity  1.020 1.005 - 1.030    Blood, UA Trace (A) Negative    pH, Urine 6.5 5.0 - 8.0    Protein, POC Negative Negative mg/dL    Urobilinogen, UA 0.2 E.U./dL Normal, 0.2 E.U./dL    Nitrite, UA Negative Negative    Leukocytes Negative Negative   KUB independent review    A KUB is available for me to review today.  The image is inspected for a bowel gas pattern and the general bone structure of the spine and pelvis. The kidneys are then inspected closely.  Renal outline " is noted if identifiable. The kidney, collecting system, and anticipated path of the ureter are examined for calcifications including those in the true pelvis.  This film reveals:    On the right there are no calcificaitons seen in the kidney or the expected course of the ureter. .    On the left there are multiple renal stones.  As well as 1 stone now appearing within the left proximal to mid ureter approximately 6 mm.    Assessment and Plan    Diagnoses and all orders for this visit:    1. Kidney stone (Primary)  -     POC Urinalysis Dipstick, Multipro  -     allopurinol (Zyloprim) 100 MG tablet; Take 1 tablet by mouth Daily.  Dispense: 30 tablet; Refill: 11  -     Case Request; Standing  -     ECG 12 Lead; Future  -     sodium chloride 0.9 % infusion  -     levoFLOXacin (LEVAQUIN) 500 mg in sodium chloride 0.9 % 150 mL IVPB  -     Case Request  -     CBC (No Diff); Future  -     Basic Metabolic Panel; Future    Other orders  -     Follow Anesthesia Guidelines / Protocol; Future  -     Follow Anesthesia Guidelines / Protocol; Standing  -     Verify / Perform Chlorhexidine Skin Prep; Standing  -     Obtain Informed Consent; Future  -     Provide NPO Instructions to Patient; Future  -     Chlorhexidine Skin Prep; Future  -     XR Abdomen KUB; Standing  -     Place Sequential Compression Device; Standing  -     Maintain Sequential Compression Device; Standing      Patient with an extensive history of stones for which currently seems to be on a trend of every 6 months passing a stone or requiring surgical intervention.    Was originally scheduled to review the recent 24-hour urine metabolic evaluation results.  Developed new onset left flank pain as of yesterday    Underwent KUB imaging today with now what appears to be a 6 mm stone within the left proximal to mid ureter.  Based on this finding patient would like to go ahead and proceed with  Left ESWL with Dr. Avila.  Patient was educated on the risks and benefits  of the procedure as well as potential side effects.  Patient voiced understanding and is in agreement to proceed.    Based on recent 24-hour urine metabolic evaluation it revealed elevated uric acid along with serum elevation of uric acid.  Based on this finding recommend starting patient on daily allopurinol 100 mg.  Patient also with hypocitraturia and low calcium oxalate saturation.  Recommend decreasing sodium intake and increasing water intake as well as lemonade therapy.  Have provided information regarding calcium oxalate diet.

## 2024-09-16 ENCOUNTER — LAB (OUTPATIENT)
Dept: LAB | Facility: HOSPITAL | Age: 44
End: 2024-09-16
Payer: COMMERCIAL

## 2024-09-16 DIAGNOSIS — N20.0 NEPHROLITHIASIS: ICD-10-CM

## 2024-09-16 LAB — URATE SERPL-MCNC: 8.9 MG/DL (ref 3.4–7)

## 2024-09-16 PROCEDURE — 84550 ASSAY OF BLOOD/URIC ACID: CPT

## 2024-09-16 PROCEDURE — 83970 ASSAY OF PARATHORMONE: CPT

## 2024-09-16 PROCEDURE — 36415 COLL VENOUS BLD VENIPUNCTURE: CPT

## 2024-09-16 PROCEDURE — 82310 ASSAY OF CALCIUM: CPT

## 2024-09-17 LAB
CALCIUM SPEC-SCNC: 9.8 MG/DL (ref 8.6–10.5)
PTH-INTACT SERPL-MCNC: 17.8 PG/ML (ref 15–65)

## 2024-09-18 ENCOUNTER — OFFICE VISIT (OUTPATIENT)
Dept: OTOLARYNGOLOGY | Facility: CLINIC | Age: 44
End: 2024-09-18
Payer: COMMERCIAL

## 2024-09-18 VITALS
WEIGHT: 288 LBS | DIASTOLIC BLOOD PRESSURE: 86 MMHG | TEMPERATURE: 98.7 F | BODY MASS INDEX: 41.23 KG/M2 | HEIGHT: 70 IN | HEART RATE: 69 BPM | SYSTOLIC BLOOD PRESSURE: 148 MMHG

## 2024-09-18 DIAGNOSIS — R59.0 CERVICAL ADENOPATHY: ICD-10-CM

## 2024-09-18 DIAGNOSIS — E04.2 MULTIPLE THYROID NODULES: Primary | ICD-10-CM

## 2024-09-18 DIAGNOSIS — E07.89 THYROID MASS OF UNCLEAR ETIOLOGY: ICD-10-CM

## 2024-09-18 RX ORDER — OXYMETAZOLINE HYDROCHLORIDE 0.05 G/100ML
2 SPRAY NASAL
OUTPATIENT
Start: 2024-09-18 | End: 2024-09-21

## 2024-09-19 DIAGNOSIS — R59.0 CERVICAL ADENOPATHY: Primary | ICD-10-CM

## 2024-09-20 ENCOUNTER — OFFICE VISIT (OUTPATIENT)
Dept: UROLOGY | Facility: CLINIC | Age: 44
End: 2024-09-20
Payer: COMMERCIAL

## 2024-09-20 ENCOUNTER — HOSPITAL ENCOUNTER (OUTPATIENT)
Dept: GENERAL RADIOLOGY | Facility: HOSPITAL | Age: 44
Discharge: HOME | End: 2024-09-20

## 2024-09-20 VITALS — WEIGHT: 288 LBS | BODY MASS INDEX: 41.23 KG/M2 | TEMPERATURE: 97.7 F | HEIGHT: 70 IN

## 2024-09-20 DIAGNOSIS — N20.0 KIDNEY STONE: ICD-10-CM

## 2024-09-20 DIAGNOSIS — N20.0 KIDNEY STONE: Primary | ICD-10-CM

## 2024-09-20 PROCEDURE — 74018 RADEX ABDOMEN 1 VIEW: CPT

## 2024-09-20 RX ORDER — SODIUM CHLORIDE 9 MG/ML
100 INJECTION, SOLUTION INTRAVENOUS CONTINUOUS
OUTPATIENT
Start: 2024-09-20

## 2024-09-20 RX ORDER — ALLOPURINOL 100 MG/1
100 TABLET ORAL DAILY
Qty: 30 TABLET | Refills: 11 | Status: SHIPPED | OUTPATIENT
Start: 2024-09-20

## 2024-09-25 ENCOUNTER — PRE-ADMISSION TESTING (OUTPATIENT)
Dept: PREADMISSION TESTING | Facility: HOSPITAL | Age: 44
End: 2024-09-25
Payer: COMMERCIAL

## 2024-09-25 VITALS
SYSTOLIC BLOOD PRESSURE: 135 MMHG | HEART RATE: 78 BPM | RESPIRATION RATE: 18 BRPM | HEIGHT: 70 IN | BODY MASS INDEX: 41.79 KG/M2 | OXYGEN SATURATION: 99 % | DIASTOLIC BLOOD PRESSURE: 93 MMHG | WEIGHT: 291.89 LBS

## 2024-09-25 DIAGNOSIS — N20.0 KIDNEY STONE: ICD-10-CM

## 2024-09-25 LAB
ANION GAP SERPL CALCULATED.3IONS-SCNC: 11 MMOL/L (ref 5–15)
BUN SERPL-MCNC: 12 MG/DL (ref 6–20)
BUN/CREAT SERPL: 14.1 (ref 7–25)
CALCIUM SPEC-SCNC: 9.5 MG/DL (ref 8.6–10.5)
CHLORIDE SERPL-SCNC: 101 MMOL/L (ref 98–107)
CO2 SERPL-SCNC: 28 MMOL/L (ref 22–29)
CREAT SERPL-MCNC: 0.85 MG/DL (ref 0.76–1.27)
DEPRECATED RDW RBC AUTO: 38.5 FL (ref 37–54)
EGFRCR SERPLBLD CKD-EPI 2021: 109.9 ML/MIN/1.73
ERYTHROCYTE [DISTWIDTH] IN BLOOD BY AUTOMATED COUNT: 13.2 % (ref 12.3–15.4)
GLUCOSE SERPL-MCNC: 63 MG/DL (ref 65–99)
HCT VFR BLD AUTO: 42.9 % (ref 37.5–51)
HGB BLD-MCNC: 14.3 G/DL (ref 13–17.7)
MCH RBC QN AUTO: 27 PG (ref 26.6–33)
MCHC RBC AUTO-ENTMCNC: 33.3 G/DL (ref 31.5–35.7)
MCV RBC AUTO: 80.9 FL (ref 79–97)
PLATELET # BLD AUTO: 312 10*3/MM3 (ref 140–450)
PMV BLD AUTO: 10.1 FL (ref 6–12)
POTASSIUM SERPL-SCNC: 3.8 MMOL/L (ref 3.5–5.2)
RBC # BLD AUTO: 5.3 10*6/MM3 (ref 4.14–5.8)
SODIUM SERPL-SCNC: 140 MMOL/L (ref 136–145)
WBC NRBC COR # BLD AUTO: 11.46 10*3/MM3 (ref 3.4–10.8)

## 2024-09-25 PROCEDURE — 80048 BASIC METABOLIC PNL TOTAL CA: CPT

## 2024-09-25 PROCEDURE — 93005 ELECTROCARDIOGRAM TRACING: CPT

## 2024-09-25 PROCEDURE — 36415 COLL VENOUS BLD VENIPUNCTURE: CPT

## 2024-09-25 PROCEDURE — 85027 COMPLETE CBC AUTOMATED: CPT

## 2024-09-26 LAB
QT INTERVAL: 384 MS
QTC INTERVAL: 417 MS

## 2024-09-30 ENCOUNTER — PATIENT MESSAGE (OUTPATIENT)
Dept: UROLOGY | Facility: CLINIC | Age: 44
End: 2024-09-30
Payer: COMMERCIAL

## 2024-10-01 ENCOUNTER — HOSPITAL ENCOUNTER (OUTPATIENT)
Dept: ULTRASOUND IMAGING | Facility: HOSPITAL | Age: 44
Discharge: HOME | End: 2024-10-01
Payer: COMMERCIAL

## 2024-10-01 DIAGNOSIS — R59.0 CERVICAL ADENOPATHY: ICD-10-CM

## 2024-10-01 DIAGNOSIS — E07.89 THYROID MASS OF UNCLEAR ETIOLOGY: ICD-10-CM

## 2024-10-01 PROCEDURE — 76942 ECHO GUIDE FOR BIOPSY: CPT

## 2024-10-01 PROCEDURE — 88172 CYTP DX EVAL FNA 1ST EA SITE: CPT | Performed by: OTOLARYNGOLOGY

## 2024-10-01 PROCEDURE — 88112 CYTOPATH CELL ENHANCE TECH: CPT | Performed by: OTOLARYNGOLOGY

## 2024-10-01 RX ORDER — LIDOCAINE HYDROCHLORIDE 10 MG/ML
5 INJECTION, SOLUTION INFILTRATION; PERINEURAL ONCE
Status: DISPENSED | OUTPATIENT
Start: 2024-10-01

## 2024-10-02 ENCOUNTER — ANESTHESIA EVENT (OUTPATIENT)
Dept: PERIOP | Facility: HOSPITAL | Age: 44
End: 2024-10-02
Payer: COMMERCIAL

## 2024-10-02 ENCOUNTER — ANESTHESIA (OUTPATIENT)
Dept: PERIOP | Facility: HOSPITAL | Age: 44
End: 2024-10-02
Payer: COMMERCIAL

## 2024-10-02 ENCOUNTER — HOSPITAL ENCOUNTER (OUTPATIENT)
Facility: HOSPITAL | Age: 44
Setting detail: HOSPITAL OUTPATIENT SURGERY
Discharge: HOME | End: 2024-10-02
Attending: UROLOGY | Admitting: UROLOGY
Payer: COMMERCIAL

## 2024-10-02 ENCOUNTER — APPOINTMENT (OUTPATIENT)
Dept: GENERAL RADIOLOGY | Facility: HOSPITAL | Age: 44
End: 2024-10-02
Payer: COMMERCIAL

## 2024-10-02 ENCOUNTER — TELEPHONE (OUTPATIENT)
Dept: OTOLARYNGOLOGY | Facility: CLINIC | Age: 44
End: 2024-10-02
Payer: COMMERCIAL

## 2024-10-02 VITALS
OXYGEN SATURATION: 92 % | SYSTOLIC BLOOD PRESSURE: 143 MMHG | HEART RATE: 91 BPM | RESPIRATION RATE: 16 BRPM | TEMPERATURE: 97.7 F | DIASTOLIC BLOOD PRESSURE: 94 MMHG

## 2024-10-02 DIAGNOSIS — N20.0 KIDNEY STONE: ICD-10-CM

## 2024-10-02 LAB
BEAKER LAB AP INTRAOPERATIVE CONSULTATION: NORMAL
CYTO UR: NORMAL
LAB AP CASE REPORT: NORMAL
LAB AP DIAGNOSIS COMMENT: NORMAL
Lab: NORMAL
PATH REPORT.FINAL DX SPEC: NORMAL
PATH REPORT.GROSS SPEC: NORMAL

## 2024-10-02 PROCEDURE — 25010000002 LEVOFLOXACIN PER 250 MG

## 2024-10-02 PROCEDURE — 25010000002 LIDOCAINE PF 2% 2 % SOLUTION: Performed by: NURSE ANESTHETIST, CERTIFIED REGISTERED

## 2024-10-02 PROCEDURE — 25010000002 PROPOFOL 1000 MG/100ML EMULSION: Performed by: NURSE ANESTHETIST, CERTIFIED REGISTERED

## 2024-10-02 PROCEDURE — 74018 RADEX ABDOMEN 1 VIEW: CPT

## 2024-10-02 PROCEDURE — 25010000002 ONDANSETRON PER 1 MG: Performed by: NURSE ANESTHETIST, CERTIFIED REGISTERED

## 2024-10-02 PROCEDURE — 25010000002 FENTANYL CITRATE (PF) 100 MCG/2ML SOLUTION: Performed by: NURSE ANESTHETIST, CERTIFIED REGISTERED

## 2024-10-02 PROCEDURE — 25010000002 DEXAMETHASONE PER 1 MG: Performed by: NURSE ANESTHETIST, CERTIFIED REGISTERED

## 2024-10-02 PROCEDURE — 25010000002 MIDAZOLAM PER 1MG: Performed by: ANESTHESIOLOGY

## 2024-10-02 PROCEDURE — 25010000002 DEXAMETHASONE PER 1 MG: Performed by: ANESTHESIOLOGY

## 2024-10-02 PROCEDURE — 50590 FRAGMENTING OF KIDNEY STONE: CPT | Performed by: UROLOGY

## 2024-10-02 PROCEDURE — 25810000003 LACTATED RINGERS PER 1000 ML: Performed by: UROLOGY

## 2024-10-02 PROCEDURE — 25010000002 KETOROLAC TROMETHAMINE PER 15 MG: Performed by: NURSE ANESTHETIST, CERTIFIED REGISTERED

## 2024-10-02 PROCEDURE — 25010000002 DROPERIDOL PER 5 MG: Performed by: NURSE ANESTHETIST, CERTIFIED REGISTERED

## 2024-10-02 PROCEDURE — 25010000002 HYDROMORPHONE 1 MG/ML SOLUTION: Performed by: NURSE ANESTHETIST, CERTIFIED REGISTERED

## 2024-10-02 RX ORDER — NALOXONE HCL 0.4 MG/ML
0.4 VIAL (ML) INJECTION AS NEEDED
Status: DISCONTINUED | OUTPATIENT
Start: 2024-10-02 | End: 2024-10-02 | Stop reason: HOSPADM

## 2024-10-02 RX ORDER — HYDROCODONE BITARTRATE AND ACETAMINOPHEN 7.5; 325 MG/1; MG/1
1 TABLET ORAL EVERY 6 HOURS PRN
Qty: 12 TABLET | Refills: 0 | Status: SHIPPED | OUTPATIENT
Start: 2024-10-02

## 2024-10-02 RX ORDER — HYDROCODONE BITARTRATE AND ACETAMINOPHEN 10; 325 MG/1; MG/1
1 TABLET ORAL EVERY 4 HOURS PRN
Status: DISCONTINUED | OUTPATIENT
Start: 2024-10-02 | End: 2024-10-02 | Stop reason: HOSPADM

## 2024-10-02 RX ORDER — EPHEDRINE SULFATE 50 MG/ML
INJECTION INTRAVENOUS AS NEEDED
Status: DISCONTINUED | OUTPATIENT
Start: 2024-10-02 | End: 2024-10-02 | Stop reason: SURG

## 2024-10-02 RX ORDER — SODIUM CHLORIDE 9 MG/ML
100 INJECTION, SOLUTION INTRAVENOUS CONTINUOUS
Status: DISCONTINUED | OUTPATIENT
Start: 2024-10-02 | End: 2024-10-02 | Stop reason: HOSPADM

## 2024-10-02 RX ORDER — LIDOCAINE HYDROCHLORIDE 10 MG/ML
0.5 INJECTION, SOLUTION EPIDURAL; INFILTRATION; INTRACAUDAL; PERINEURAL ONCE AS NEEDED
Status: DISCONTINUED | OUTPATIENT
Start: 2024-10-02 | End: 2024-10-02 | Stop reason: HOSPADM

## 2024-10-02 RX ORDER — DEXAMETHASONE SODIUM PHOSPHATE 4 MG/ML
INJECTION, SOLUTION INTRA-ARTICULAR; INTRALESIONAL; INTRAMUSCULAR; INTRAVENOUS; SOFT TISSUE AS NEEDED
Status: DISCONTINUED | OUTPATIENT
Start: 2024-10-02 | End: 2024-10-02 | Stop reason: SURG

## 2024-10-02 RX ORDER — ONDANSETRON 2 MG/ML
4 INJECTION INTRAMUSCULAR; INTRAVENOUS ONCE AS NEEDED
Status: DISCONTINUED | OUTPATIENT
Start: 2024-10-02 | End: 2024-10-02 | Stop reason: HOSPADM

## 2024-10-02 RX ORDER — KETOROLAC TROMETHAMINE 30 MG/ML
INJECTION, SOLUTION INTRAMUSCULAR; INTRAVENOUS AS NEEDED
Status: DISCONTINUED | OUTPATIENT
Start: 2024-10-02 | End: 2024-10-02 | Stop reason: SURG

## 2024-10-02 RX ORDER — SODIUM CHLORIDE 0.9 % (FLUSH) 0.9 %
3-10 SYRINGE (ML) INJECTION AS NEEDED
Status: DISCONTINUED | OUTPATIENT
Start: 2024-10-02 | End: 2024-10-02 | Stop reason: HOSPADM

## 2024-10-02 RX ORDER — DROPERIDOL 2.5 MG/ML
0.62 INJECTION, SOLUTION INTRAMUSCULAR; INTRAVENOUS ONCE AS NEEDED
Status: DISCONTINUED | OUTPATIENT
Start: 2024-10-02 | End: 2024-10-02 | Stop reason: HOSPADM

## 2024-10-02 RX ORDER — LEVOFLOXACIN 5 MG/ML
500 INJECTION, SOLUTION INTRAVENOUS ONCE
Status: COMPLETED | OUTPATIENT
Start: 2024-10-02 | End: 2024-10-02

## 2024-10-02 RX ORDER — LABETALOL HYDROCHLORIDE 5 MG/ML
5 INJECTION, SOLUTION INTRAVENOUS
Status: DISCONTINUED | OUTPATIENT
Start: 2024-10-02 | End: 2024-10-02 | Stop reason: HOSPADM

## 2024-10-02 RX ORDER — ONDANSETRON 4 MG/1
4 TABLET, ORALLY DISINTEGRATING ORAL ONCE AS NEEDED
Status: DISCONTINUED | OUTPATIENT
Start: 2024-10-02 | End: 2024-10-02 | Stop reason: HOSPADM

## 2024-10-02 RX ORDER — SODIUM CHLORIDE 9 MG/ML
40 INJECTION, SOLUTION INTRAVENOUS AS NEEDED
Status: DISCONTINUED | OUTPATIENT
Start: 2024-10-02 | End: 2024-10-02 | Stop reason: HOSPADM

## 2024-10-02 RX ORDER — FLUMAZENIL 0.1 MG/ML
0.2 INJECTION INTRAVENOUS AS NEEDED
Status: DISCONTINUED | OUTPATIENT
Start: 2024-10-02 | End: 2024-10-02 | Stop reason: HOSPADM

## 2024-10-02 RX ORDER — LIDOCAINE HYDROCHLORIDE 20 MG/ML
INJECTION, SOLUTION EPIDURAL; INFILTRATION; INTRACAUDAL; PERINEURAL AS NEEDED
Status: DISCONTINUED | OUTPATIENT
Start: 2024-10-02 | End: 2024-10-02 | Stop reason: SURG

## 2024-10-02 RX ORDER — HYDROCODONE BITARTRATE AND ACETAMINOPHEN 5; 325 MG/1; MG/1
1 TABLET ORAL EVERY 4 HOURS PRN
Status: DISCONTINUED | OUTPATIENT
Start: 2024-10-02 | End: 2024-10-02 | Stop reason: HOSPADM

## 2024-10-02 RX ORDER — FENTANYL CITRATE 50 UG/ML
50 INJECTION, SOLUTION INTRAMUSCULAR; INTRAVENOUS
Status: DISCONTINUED | OUTPATIENT
Start: 2024-10-02 | End: 2024-10-02 | Stop reason: HOSPADM

## 2024-10-02 RX ORDER — HYDROCODONE BITARTRATE AND ACETAMINOPHEN 7.5; 325 MG/1; MG/1
1 TABLET ORAL ONCE AS NEEDED
Status: DISCONTINUED | OUTPATIENT
Start: 2024-10-02 | End: 2024-10-02 | Stop reason: HOSPADM

## 2024-10-02 RX ORDER — PROPOFOL 10 MG/ML
INJECTION, EMULSION INTRAVENOUS CONTINUOUS PRN
Status: DISCONTINUED | OUTPATIENT
Start: 2024-10-02 | End: 2024-10-02 | Stop reason: SURG

## 2024-10-02 RX ORDER — ONDANSETRON 4 MG/1
4 TABLET, FILM COATED ORAL EVERY 8 HOURS PRN
Qty: 12 TABLET | Refills: 0 | Status: SHIPPED | OUTPATIENT
Start: 2024-10-02

## 2024-10-02 RX ORDER — ACETAMINOPHEN 500 MG
1000 TABLET ORAL ONCE
Status: COMPLETED | OUTPATIENT
Start: 2024-10-02 | End: 2024-10-02

## 2024-10-02 RX ORDER — SODIUM CHLORIDE, SODIUM LACTATE, POTASSIUM CHLORIDE, CALCIUM CHLORIDE 600; 310; 30; 20 MG/100ML; MG/100ML; MG/100ML; MG/100ML
1000 INJECTION, SOLUTION INTRAVENOUS CONTINUOUS
Status: DISCONTINUED | OUTPATIENT
Start: 2024-10-02 | End: 2024-10-02 | Stop reason: HOSPADM

## 2024-10-02 RX ORDER — ONDANSETRON 2 MG/ML
INJECTION INTRAMUSCULAR; INTRAVENOUS AS NEEDED
Status: DISCONTINUED | OUTPATIENT
Start: 2024-10-02 | End: 2024-10-02 | Stop reason: SURG

## 2024-10-02 RX ORDER — SODIUM CHLORIDE, SODIUM LACTATE, POTASSIUM CHLORIDE, CALCIUM CHLORIDE 600; 310; 30; 20 MG/100ML; MG/100ML; MG/100ML; MG/100ML
100 INJECTION, SOLUTION INTRAVENOUS CONTINUOUS
Status: DISCONTINUED | OUTPATIENT
Start: 2024-10-02 | End: 2024-10-02 | Stop reason: HOSPADM

## 2024-10-02 RX ORDER — FENTANYL CITRATE 50 UG/ML
INJECTION, SOLUTION INTRAMUSCULAR; INTRAVENOUS AS NEEDED
Status: DISCONTINUED | OUTPATIENT
Start: 2024-10-02 | End: 2024-10-02 | Stop reason: SURG

## 2024-10-02 RX ORDER — DEXAMETHASONE SODIUM PHOSPHATE 4 MG/ML
4 INJECTION, SOLUTION INTRA-ARTICULAR; INTRALESIONAL; INTRAMUSCULAR; INTRAVENOUS; SOFT TISSUE ONCE AS NEEDED
Status: COMPLETED | OUTPATIENT
Start: 2024-10-02 | End: 2024-10-02

## 2024-10-02 RX ORDER — DROPERIDOL 2.5 MG/ML
INJECTION, SOLUTION INTRAMUSCULAR; INTRAVENOUS AS NEEDED
Status: DISCONTINUED | OUTPATIENT
Start: 2024-10-02 | End: 2024-10-02 | Stop reason: SURG

## 2024-10-02 RX ORDER — SODIUM CHLORIDE 0.9 % (FLUSH) 0.9 %
3 SYRINGE (ML) INJECTION EVERY 12 HOURS SCHEDULED
Status: DISCONTINUED | OUTPATIENT
Start: 2024-10-02 | End: 2024-10-02 | Stop reason: HOSPADM

## 2024-10-02 RX ORDER — SODIUM CHLORIDE 0.9 % (FLUSH) 0.9 %
3 SYRINGE (ML) INJECTION AS NEEDED
Status: DISCONTINUED | OUTPATIENT
Start: 2024-10-02 | End: 2024-10-02 | Stop reason: HOSPADM

## 2024-10-02 RX ORDER — MIDAZOLAM HYDROCHLORIDE 2 MG/2ML
2 INJECTION, SOLUTION INTRAMUSCULAR; INTRAVENOUS
Status: DISCONTINUED | OUTPATIENT
Start: 2024-10-02 | End: 2024-10-02 | Stop reason: HOSPADM

## 2024-10-02 RX ORDER — IBUPROFEN 600 MG/1
600 TABLET, FILM COATED ORAL EVERY 6 HOURS PRN
Status: DISCONTINUED | OUTPATIENT
Start: 2024-10-02 | End: 2024-10-02 | Stop reason: HOSPADM

## 2024-10-02 RX ORDER — SCOLOPAMINE TRANSDERMAL SYSTEM 1 MG/1
1 PATCH, EXTENDED RELEASE TRANSDERMAL ONCE
Status: DISCONTINUED | OUTPATIENT
Start: 2024-10-02 | End: 2024-10-02 | Stop reason: HOSPADM

## 2024-10-02 RX ADMIN — ACETAMINOPHEN 1000 MG: 500 TABLET, FILM COATED ORAL at 14:02

## 2024-10-02 RX ADMIN — LIDOCAINE HYDROCHLORIDE 100 MG: 20 INJECTION, SOLUTION EPIDURAL; INFILTRATION; INTRACAUDAL; PERINEURAL at 15:12

## 2024-10-02 RX ADMIN — DEXAMETHASONE SODIUM PHOSPHATE 4 MG: 4 INJECTION INTRA-ARTICULAR; INTRALESIONAL; INTRAMUSCULAR; INTRAVENOUS; SOFT TISSUE at 15:20

## 2024-10-02 RX ADMIN — DEXAMETHASONE SODIUM PHOSPHATE 4 MG: 4 INJECTION, SOLUTION INTRA-ARTICULAR; INTRALESIONAL; INTRAMUSCULAR; INTRAVENOUS; SOFT TISSUE at 14:02

## 2024-10-02 RX ADMIN — FENTANYL CITRATE 100 MCG: 50 INJECTION, SOLUTION INTRAMUSCULAR; INTRAVENOUS at 15:10

## 2024-10-02 RX ADMIN — SODIUM CHLORIDE, POTASSIUM CHLORIDE, SODIUM LACTATE AND CALCIUM CHLORIDE 1000 ML: 600; 310; 30; 20 INJECTION, SOLUTION INTRAVENOUS at 13:29

## 2024-10-02 RX ADMIN — HYDROMORPHONE HYDROCHLORIDE 1 MG: 1 INJECTION, SOLUTION INTRAMUSCULAR; INTRAVENOUS; SUBCUTANEOUS at 16:17

## 2024-10-02 RX ADMIN — KETOROLAC TROMETHAMINE 30 MG: 30 INJECTION, SOLUTION INTRAMUSCULAR; INTRAVENOUS at 15:49

## 2024-10-02 RX ADMIN — SCOPALAMINE 1 PATCH: 1 PATCH, EXTENDED RELEASE TRANSDERMAL at 14:02

## 2024-10-02 RX ADMIN — PROPOFOL INJECTABLE EMULSION 150 MCG/KG/MIN: 10 INJECTION, EMULSION INTRAVENOUS at 15:13

## 2024-10-02 RX ADMIN — ONDANSETRON 4 MG: 2 INJECTION INTRAMUSCULAR; INTRAVENOUS at 15:20

## 2024-10-02 RX ADMIN — MIDAZOLAM HYDROCHLORIDE 2 MG: 1 INJECTION, SOLUTION INTRAMUSCULAR; INTRAVENOUS at 14:02

## 2024-10-02 RX ADMIN — DROPERIDOL 1.25 MG: 2.5 INJECTION, SOLUTION INTRAMUSCULAR; INTRAVENOUS at 15:18

## 2024-10-02 RX ADMIN — LEVOFLOXACIN 500 MG: 5 INJECTION, SOLUTION INTRAVENOUS at 13:39

## 2024-10-02 RX ADMIN — EPHEDRINE SULFATE 25 MG: 50 INJECTION INTRAVENOUS at 15:18

## 2024-10-02 NOTE — ANESTHESIA PREPROCEDURE EVALUATION
Anesthesia Evaluation     Patient summary reviewed   NPO Solid Status: > 8 hours  NPO Liquid Status: > 8 hours           Airway   Mallampati: I  No difficulty expected  Dental      Pulmonary    (+) ,sleep apnea on CPAP  (-) not a smoker  Cardiovascular   Exercise tolerance: good (4-7 METS)    (+) hypertension  (-) CAD      Neuro/Psych  (-) seizures, TIA, CVA  GI/Hepatic/Renal/Endo    (+) morbid obesity, GERD, renal disease- stones  (-) diabetes    Musculoskeletal     Abdominal    Substance History      OB/GYN          Other                      Anesthesia Plan    ASA 3     general     intravenous induction     Anesthetic plan, risks, benefits, and alternatives have been provided, discussed and informed consent has been obtained with: patient.    CODE STATUS:

## 2024-10-02 NOTE — ANESTHESIA POSTPROCEDURE EVALUATION
Patient: Micah Spencer Jr.    Procedure Summary       Date: 10/02/24 Room / Location:  [facility] 08 /  [facility]    Anesthesia Start: 1510 Anesthesia Stop:     Procedure: EXTRACORPOREAL SHOCKWAVE LITHOTRIPSY-left (Left) Diagnosis:       Kidney stone      (Kidney stone [N20.0])    Surgeons: Mt Avila MD Provider: CHANTAL Koch CRNA    Anesthesia Type: general ASA Status: 3            Anesthesia Type: general    Vitals  Vitals Value Taken Time   /79 10/02/24 1743   Temp 97.7 °F (36.5 °C) 10/02/24 1735   Pulse 83 10/02/24 1743   Resp 14 10/02/24 1735   SpO2 92 % 10/02/24 1743   Vitals shown include unfiled device data.        Post Anesthesia Care and Evaluation    Patient location during evaluation: PACU  Patient participation: complete - patient participated  Level of consciousness: awake and alert  Pain management: adequate    Airway patency: patent  Anesthetic complications: No anesthetic complications  PONV Status: none  Cardiovascular status: acceptable and hemodynamically stable  Respiratory status: acceptable  Hydration status: acceptable    Comments: Blood pressure 131/79, pulse 102, temperature 97.7 °F (36.5 °C), temperature source Temporal, resp. rate 16, SpO2 92%.    Patient discharged from PACU based upon Frannie score. Please see RN notes for further details

## 2024-10-02 NOTE — ANESTHESIA PROCEDURE NOTES
Airway  Urgency: elective    Date/Time: 10/2/2024 3:13 PM  Airway not difficult    General Information and Staff    Patient location during procedure: OR  CRNA/CAA: CHANTAL Koch CRNA    Indications and Patient Condition  Indications for airway management: airway protection    Preoxygenated: yes  MILS maintained throughout  Mask difficulty assessment: 1 - vent by mask    Final Airway Details  Final airway type: endotracheal airway      Successful airway: ETT  Cuffed: yes   Successful intubation technique: direct laryngoscopy  Facilitating devices/methods: intubating stylet  Endotracheal tube insertion site: oral  Blade: Azevedo  Blade size: 3  ETT size (mm): 7.5  Cormack-Lehane Classification: grade I - full view of glottis  Placement verified by: chest auscultation and capnometry   Cuff volume (mL): 6  Measured from: gums  ETT/EBT to gums (cm): 21  Number of attempts at approach: 1  Assessment: lips, teeth, and gum same as pre-op and atraumatic intubation

## 2024-10-02 NOTE — TELEPHONE ENCOUNTER
"Spoke to patient, gave results of FNA per Dr Kee \"Please call patient tell him the fine-needle aspiration results revealed a low risk thyroid nodule and no evidence of cancer in the adjacent lymph nodes.  I will discuss this with him further next visit. \" Estrella appt on 10/28/24 at 8:45. He Vu  "

## 2024-10-02 NOTE — OP NOTE
OP NOTE  Micah Spencer Jr.    Date of Procedure: 10/2/2024    Pre-op Diagnosis:   Kidney stone [N20.0]    Post-op Diagnosis:     Post-Op Diagnosis Codes:     * Kidney stone [N20.0]    Procedure/CPT® Codes:      Procedure(s):  EXTRACORPOREAL SHOCKWAVE LITHOTRIPSY-left    Surgeon(s):  Mt Avila MD    Anesthesia: General    Staff:   Circulator: Erna Linder RN    Indications:   Left proximal ureteral calculus with obstruction.   After discussion of options, patient has given informed consent to undergo left ESWL.  All risks, benefits, and alternatives were discussed.    Operative Report: The patient is identified. The KUB is reviewed. After answering any questions, patient was brought to the operating room and placed on the patient table of the [facility].  General endotracheal anesthesia was administered.  Preoperative KUB was reviewed.   [place] [facility] [place] fluoroscope was used to identify the stone.    The shock-head is brought into position.  This stone is brought into the F2 plane for focus.  Treatment was initiated.  We gradually increased the energy level from 1 to 7.  This stone was treated at a rate of 60.  We paused for 2 minutes after 300 shocks to reduce the risk of renal damage.  The stone was periodically checked to make sure that we were on it and getting good breakup.  We checked at 700, 1000, 1500, 2000, and 2500 shocks.  The stone did appear to have good breakup.  I felt it was unnecessary to place a ureteral stent.  At the conclusion of 3000 shocks, the patient was awakened from anesthesia and transferred to the recovery room in satisfactory condition.      Estimated Blood Loss: <30 mL    Specimens:                None      Drains: * No LDAs found *    Complications: none  Plan: Repeat KUB in a few weeks. Home today if no evidence of infection or significant bleeding.     Mt Avila MD     Date: 10/2/2024  Time: 16:21 CDT

## 2024-10-02 NOTE — TELEPHONE ENCOUNTER
----- Message from Donald Kee sent at 10/2/2024  9:55 AM CDT -----  Regarding: Fine-needle aspiration results  Please call patient tell him the fine-needle aspiration results revealed a low risk thyroid nodule and no evidence of cancer in the adjacent lymph nodes.  I will discuss this with him further next visit.  ----- Message -----  From: Lab, Background User  Sent: 10/2/2024   8:58 AM CDT  To: Donald Kee Jr., MD

## 2024-10-03 NOTE — ANESTHESIA POSTPROCEDURE EVALUATION
Patient: Micah Spencer Jr.    Procedure Summary       Date: 10/02/24 Room / Location:  [facility]  /  [facility]    Anesthesia Start: 1510 Anesthesia Stop: 1623    Procedure: EXTRACORPOREAL SHOCKWAVE LITHOTRIPSY-left (Left) Diagnosis:       Kidney stone      (Kidney stone [N20.0])    Surgeons: Mt Avila MD Provider: CHANTAL Koch CRNA    Anesthesia Type: general ASA Status: 3            Anesthesia Type: general    Vitals  Vitals Value Taken Time   /79 10/02/24 1743   Temp 97.7 °F (36.5 °C) 10/02/24 1735   Pulse 83 10/02/24 1743   Resp 14 10/02/24 1735   SpO2 92 % 10/02/24 1743   Vitals shown include unfiled device data.        Post Anesthesia Care and Evaluation    Patient location during evaluation: PHASE II  Patient participation: complete - patient participated  Level of consciousness: awake and alert  Pain score: 0  Pain management: adequate    Airway patency: patent  Anesthetic complications: No anesthetic complications  PONV Status: none  Cardiovascular status: acceptable and blood pressure returned to baseline  Respiratory status: acceptable  Hydration status: acceptable    Comments: Patient discharged from PACU based on Frannie score >8  No anesthesia care post op

## 2024-10-28 ENCOUNTER — OFFICE VISIT (OUTPATIENT)
Dept: OTOLARYNGOLOGY | Facility: CLINIC | Age: 44
End: 2024-10-28
Payer: COMMERCIAL

## 2024-10-28 VITALS
TEMPERATURE: 98.2 F | HEART RATE: 65 BPM | HEIGHT: 70 IN | DIASTOLIC BLOOD PRESSURE: 87 MMHG | SYSTOLIC BLOOD PRESSURE: 153 MMHG | WEIGHT: 287 LBS | BODY MASS INDEX: 41.09 KG/M2

## 2024-10-28 DIAGNOSIS — R59.0 CERVICAL ADENOPATHY: Primary | ICD-10-CM

## 2024-10-28 DIAGNOSIS — E07.89 THYROID MASS OF UNCLEAR ETIOLOGY: ICD-10-CM

## 2024-10-28 DIAGNOSIS — E04.2 MULTIPLE THYROID NODULES: ICD-10-CM

## 2024-10-28 NOTE — PATIENT INSTRUCTIONS
Thyroid and neck ultrasound in 6 months    Call for any growth     CONTACT INFORMATION:  The main office phone number is [phone]. For emergencies after hours and on weekends, this number will convert over to our answering service and the on call provider will answer. Please try to keep non emergent phone calls/ questions to office hours 9am-5pm Monday through Friday.     [employer]  As an alternative, you can sign up and use the Epic MyChart system for more direct and quicker access for non emergent questions/ problems.  [facility] [employer] allows you to send messages to your doctor, view your test results, renew your prescriptions, schedule appointments, and more. To sign up, go to [employer] and click on the Sign Up Now link in the New User? box. Enter your [employer] Activation Code exactly as it appears below along with the last four digits of your Social Security Number and your Date of Birth () to complete the sign-up process. If you do not sign up before the expiration date, you must request a new code.    [employer] Activation Code: Activation code not generated  Current Findery Status: Active    If you have questions, you can email [email or web address] or call [phone] to talk to our [employer] staff. Remember, [employer] is NOT to be used for urgent needs. For medical emergencies, dial 911.

## 2024-10-28 NOTE — PROGRESS NOTES
Donald Kee Jr, MD  [facility] EAR NOSE & THROAT  [address], [address]  [facility] [address]  Fax [phone]  Phone [phone]      Visit Type: FOLLOW UP   Chief Complaint   Patient presents with    discuss FNA           HISTORY OBTAINED BY: patient  Accompanied by: No one  HPI  He presents for a follow up evaluation. Has had U/S biopsy. Here for results.  No growth     Past Medical History:   Diagnosis Date    Bell's palsy     Disease of thyroid gland 2017    GERD (gastroesophageal reflux disease)     Hypertension     Kidney stone     Pancreatic cyst     PONV (postoperative nausea and vomiting)     Sleep apnea     TMJ dysfunction 2015       Past Surgical History:   Procedure Laterality Date    EXTRACORPOREAL SHOCK WAVE LITHOTRIPSY (ESWL) Left 04/14/2023    Procedure: EXTRACORPOREAL SHOCKWAVE LITHOTRIPSY-left;  Surgeon: Mt Avila MD;  Location: [facility];  Service: Urology;  Laterality: Left;    EXTRACORPOREAL SHOCK WAVE LITHOTRIPSY (ESWL) Right 05/15/2024    Procedure: EXTRACORPOREAL SHOCKWAVE LITHOTRIPSY-right;  Surgeon: Mt Avila MD;  Location: [facility] OR;  Service: Urology;  Laterality: Right;    EXTRACORPOREAL SHOCK WAVE LITHOTRIPSY (ESWL) Left 10/2/2024    Procedure: EXTRACORPOREAL SHOCKWAVE LITHOTRIPSY-left;  Surgeon: Mt Avila MD;  Location: [facility];  Service: Urology;  Laterality: Left;    US GUIDED LYMPH NODE BIOPSY  10/1/2024       Family History: His family history includes Diabetes in his father and maternal grandmother; Diabetes type II in his father and maternal grandmother; Heart disease in his father; Hyperlipidemia in his father; Hypertension in his father.     Social History: He  reports that he has never smoked. He has never used smokeless tobacco. He reports that he does not drink alcohol and does not use drugs.    Home Medications:  HYDROcodone-acetaminophen, allopurinol, aluminum hydroxide-mag  carbonate, cholecalciferol, econazole nitrate, ferrous sulfate, fluticasone, hydroCHLOROthiazide, ibuprofen, ketorolac, lisinopril, ondansetron, ondansetron ODT, and tamsulosin    Allergies:  He is allergic to keflex [cephalexin] and augmentin [amoxicillin-pot clavulanate].       Vital Signs:   Temp:  [98.2 °F (36.8 °C)] 98.2 °F (36.8 °C)  Heart Rate:  [65] 65  BP: (153)/(87) 153/87  ENT Physical Exam  Constitutional  Appearance: patient appears well-developed and well-nourished, patient is cooperative;  Communication/Voice: communication appropriate for developmental age; vocal quality normal;  Constitutional comments: obese  Head and Face  Appearance: head appears normal, face appears normal and face appears atraumatic;  Palpation: facial palpation normal;  Salivary: glands normal;  Ear  Hearing: intact;  Auricles: bilateral auricles normal;  External Mastoids: right external mastoid normal; left external mastoid normal;  Ear Canals: bilateral ear canals normal;  Tympanic Membranes: bilateral tympanic membranes normal;  Nose  External Nose: nares patent bilaterally; external nose normal; nasal deformity not visible;  Internal Nose: bilateral intranasal mucosa edematous and erythematous; nasal septal deviation present; deviation is to the left, septal deviation is mild; Septum comments: Left to right low and mid moderate to severe   bilateral inferior turbinates edematous and erythematous;  Oral Cavity/Oropharynx  Lips: normal;  Teeth: normal;  Gums: gingiva normal;  Tongue: tongue macroglossia present; Reese II position; Oral Tongue comments: Mod  Oral mucosa: normal;  Hard palate: normal;  Soft palate: normal;  Tonsils: normal;  Base of Tongue: normal;  Posterior pharyngeal wall: normal;  Neck  Neck: neck normal;  Respiratory  Inspection: breathing unlabored; normal breathing rate;  Cardiovascular  Inspection: extremities are warm and well perfused; no peripheral edema present;  Neurovestibular  Mental Status:  alert and oriented;  Psychiatric: mood normal; affect is appropriate;           Result Review       RESULTS REVIEW    I have reviewed the patients old records in the chart.   I have reviewed the patients old records in the chart.  The following results/records were reviewed:  Lab Results   Component Value Date    FINALDX  10/01/2024     1.  Thyroid, left lobe, fine-needle aspiration:  Magnet category II: Benign follicular nodule.    2.  Lymph node, left neck, fine-needle aspiration:  Negative for malignant cells.  Polymorphous lymphocyte population.          PTH, Intact & Calcium (09/16/2024 13:10)    Fine Needle Aspiration (10/01/2024 10:23)    US Guided Thyroid Biopsy (10/01/2024 11:10)    US Guided Lymph Node Biopsy (10/01/2024 11:10)    IMAGING SCANNED (10/16/2024)         Assessment & Plan  Cervical adenopathy  Reactive  Multiple thyroid nodules  Stable  Thyroid mass of unclear etiology  Benign     Orders Placed This Encounter   Procedures    US Head Neck Soft Tissue    US Head Neck Soft Tissue         Medical and surgical options were discussed including observation, continued medical management, medication modification, and surgical management. Risks, benefits and alternatives were discussed and questions were answered. After considering the options, the patient decided to proceed with observation.  Patient appears to have both benign thyroid nodules and reactive adenopathy.  He is still somewhat concerned.  I will continue to monitor this.  He is to call me if there is any growth.  Otherwise, I plan to follow clinically and with ultrasound.  Thyroid and neck ultrasound in 6 months  Call for any growth in the neck    My Chart:  Patient is using My Chart    Patient understand(s) and agree(s) with the treatment plan as described.    Return in about 6 months (around 4/28/2025) for Recheck Neck and thyroid, Ultrasound.        Electronically signed by Donald Kee Jr, MD 10/29/24 6:45 AM CDT.

## 2024-10-29 NOTE — PROGRESS NOTES
12-15-Negative  MPL- Negative    Serology 2/9/2024  FOBT-Negative x 3    BCR/ABL by FISH on 5/29/2024 hematogenix-: Negative FISH Study for BCR::ABL1 Rearrangement,         Past Medical History:   Diagnosis Date    Bell's palsy     Cough     Kidney stones     Multinodular goiter     Pancreatic cyst     Sleep apnea         Past Surgical History:   Procedure Laterality Date    EXTRACORPOREAL SHOCK WAVE LITHOTRIPSY Right 04/17/2023    EXTRACORPOREAL SHOCK WAVE LITHOTRIPSY  10/02/2024    KIDNEY STONE REMOVAL  05/15/2024    KIDNEY STONE REMOVAL  10/02/2024    ADELA NEEDLE BIOPSY LYMPH NODE SUPERFICIAL  10/01/2024           Current Outpatient Medications:     allopurinol (ZYLOPRIM) 100 MG tablet, Take 1 tablet by mouth daily, Disp: , Rfl:     ferrous sulfate (IRON 325) 325 (65 Fe) MG tablet, Take 1 tablet by mouth daily (with breakfast), Disp: 30 tablet, Rfl: 1    Alum Hydroxide-Mag Carbonate (GAVISCON PO), Take 2 tablets by mouth daily, Disp: , Rfl:     fluticasone (FLONASE) 50 MCG/ACT nasal spray, 2 sprays by Each Nostril route as needed for Allergies, Disp: , Rfl:     acyclovir (ZOVIRAX) 400 MG tablet, Take 1 tablet by mouth as needed As needed for [place] Palsy, Disp: , Rfl:     econazole nitrate 1 % cream, Apply 1 Application topically as needed, Disp: , Rfl:     ketorolac (TORADOL) 10 MG tablet, Take 1 tablet by mouth every 6 hours as needed Prn for kidney stones, Disp: , Rfl:     promethazine-dextromethorphan (PROMETHAZINE-DM) 6.25-15 MG/5ML syrup, Take 5 mLs by mouth 3 times daily as needed, Disp: , Rfl:     Fexofenadine-Pseudoephedrine (ALLEGRA-D 12 HOUR PO), Take 1 tablet by mouth as needed, Disp: , Rfl:     Cholecalciferol (VITAMIN D3) 1.25 MG (73609 UT) CAPS, Take 1 capsule by mouth daily, Disp: , Rfl:     ondansetron (ZOFRAN) 8 MG tablet, Take 1 tablet by mouth every 8 hours as needed for Nausea or Vomiting, Disp: , Rfl:     HYDROcodone-acetaminophen (NORCO)  MG per tablet, Take 1 tablet by mouth every 8

## 2024-10-30 ENCOUNTER — TELEPHONE (OUTPATIENT)
Dept: HEMATOLOGY | Age: 44
End: 2024-10-30

## 2024-10-30 NOTE — TELEPHONE ENCOUNTER
I called patient and left detailed voicemail about their appointment on 11/01/24. I made patient aware not to arrive any earlier than the appointment time and to come at the time of the follow up not the time of the lab appointment if it is different than the follow up appt time. Made patient aware that we are now located at the [facility] at [address]. Located between [facility] and the [facility]. Front entrance faces [facility].

## 2024-11-01 ENCOUNTER — HOSPITAL ENCOUNTER (OUTPATIENT)
Dept: INFUSION THERAPY | Age: 44
Discharge: HOME | End: 2024-11-01
Payer: COMMERCIAL

## 2024-11-01 ENCOUNTER — OFFICE VISIT (OUTPATIENT)
Dept: HEMATOLOGY | Age: 44
End: 2024-11-01
Payer: COMMERCIAL

## 2024-11-01 VITALS
DIASTOLIC BLOOD PRESSURE: 82 MMHG | HEIGHT: 70 IN | TEMPERATURE: 98 F | OXYGEN SATURATION: 97 % | WEIGHT: 291.4 LBS | SYSTOLIC BLOOD PRESSURE: 118 MMHG | HEART RATE: 70 BPM | BODY MASS INDEX: 41.72 KG/M2

## 2024-11-01 DIAGNOSIS — N20.0 NEPHROLITHIASIS: ICD-10-CM

## 2024-11-01 DIAGNOSIS — E61.1 IRON DEFICIENCY: Primary | ICD-10-CM

## 2024-11-01 DIAGNOSIS — D72.829 LEUKOCYTOSIS, UNSPECIFIED TYPE: ICD-10-CM

## 2024-11-01 DIAGNOSIS — K86.2 PANCREATIC CYST: ICD-10-CM

## 2024-11-01 DIAGNOSIS — E61.1 IRON DEFICIENCY: ICD-10-CM

## 2024-11-01 LAB
BASOPHILS # BLD: 0.12 K/UL (ref 0.01–0.08)
BASOPHILS NFR BLD: 1.2 % (ref 0.1–1.2)
EOSINOPHIL # BLD: 0.23 K/UL (ref 0.04–0.54)
EOSINOPHIL NFR BLD: 2.3 % (ref 0.7–7)
ERYTHROCYTE [DISTWIDTH] IN BLOOD BY AUTOMATED COUNT: 13.4 % (ref 11.6–14.4)
FERRITIN SERPL-MCNC: 316 NG/ML (ref 30–400)
HCT VFR BLD AUTO: 42.6 % (ref 40.1–51)
HGB BLD-MCNC: 14.4 G/DL (ref 13.7–17.5)
IRON SATN MFR SERPL: 23 % (ref 14–50)
IRON SERPL-MCNC: 65 UG/DL (ref 59–158)
LYMPHOCYTES # BLD: 1.92 K/UL (ref 1.18–3.74)
LYMPHOCYTES NFR BLD: 19 % (ref 19.3–53.1)
MCH RBC QN AUTO: 27.2 PG (ref 25.7–32.2)
MCHC RBC AUTO-ENTMCNC: 33.8 G/DL (ref 32.3–36.5)
MCV RBC AUTO: 80.5 FL (ref 79–92.2)
MONOCYTES # BLD: 0.65 K/UL (ref 0.24–0.82)
MONOCYTES NFR BLD: 6.4 % (ref 4.7–12.5)
NEUTROPHILS # BLD: 7.16 K/UL (ref 1.56–6.13)
NEUTS SEG NFR BLD: 70.6 % (ref 34–71.1)
PLATELET # BLD AUTO: 320 K/UL (ref 163–337)
PMV BLD AUTO: 9.6 FL (ref 7.4–10.4)
RBC # BLD AUTO: 5.29 M/UL (ref 4.63–6.08)
TIBC SERPL-MCNC: 288 UG/DL (ref 250–400)
WBC # BLD AUTO: 10.13 K/UL (ref 4.23–9.07)

## 2024-11-01 PROCEDURE — 99213 OFFICE O/P EST LOW 20 MIN: CPT | Performed by: PHYSICIAN ASSISTANT

## 2024-11-01 PROCEDURE — 85025 COMPLETE CBC W/AUTO DIFF WBC: CPT

## 2024-11-01 PROCEDURE — 99213 OFFICE O/P EST LOW 20 MIN: CPT

## 2024-11-01 PROCEDURE — 36415 COLL VENOUS BLD VENIPUNCTURE: CPT

## 2024-11-01 RX ORDER — ALLOPURINOL 100 MG/1
100 TABLET ORAL DAILY
COMMUNITY
Start: 2024-09-20

## 2024-11-01 ASSESSMENT — ENCOUNTER SYMPTOMS
SHORTNESS OF BREATH: 0
WHEEZING: 0
VOICE CHANGE: 0
BACK PAIN: 1
COLOR CHANGE: 0
VOMITING: 0
BLOOD IN STOOL: 0
PHOTOPHOBIA: 0
EYE ITCHING: 0
ABDOMINAL PAIN: 0
CONSTIPATION: 0
EYE DISCHARGE: 0
TROUBLE SWALLOWING: 0
NAUSEA: 0
ABDOMINAL DISTENTION: 0
DIARRHEA: 0
COUGH: 0
SORE THROAT: 0

## 2024-11-06 NOTE — PROGRESS NOTES
Subjective    Mr. Spencer is 44 y.o. male    Chief Complaint: Follow-up s/p left ESWL    History of Present Illness    44-year-old male established patient in for follow-up s/p left ESWL Dr. Avila 10/2/2024 due to 6 mm stone in the left proximal ureter.  Patient reports no further flank pain.  Passed a few small fragments.  Has since undergone CT with and without contrast through well due to a pancreatic cyst found incidentally.  CT urogram showed no further hydronephrosis.  Multiple stones remaining within the left kidney and a few within the right.    extensive history of kidney stones last of which requiring surgical intervention right ESWL 5/2024 Dr. Avila due to a 4 mm right proximal ureteral stone that failed to pass.      History of calcium oxalate stone disease. 24-hour urine metabolic evaluation it revealed elevated uric acid along with serum elevation of uric acid. started on daily allopurinol 100 mg.  Patient also with hypocitraturia and low calcium oxalate saturation.       Is currently followed by Dr. Cano's office where patient is undergoing multiple labs.  Experiencing chronic anemia.  Reports they are currently concern for possible CML.  Currently awaiting getting scheduled for lymph node biopsy with Dr. Kee.    The following portions of the patient's history were reviewed and updated as appropriate: allergies, current medications, past family history, past medical history, past social history, past surgical history and problem list.    Review of Systems   Constitutional:  Negative for chills and fever.   Gastrointestinal:  Negative for abdominal pain, anal bleeding and blood in stool.   Genitourinary:  Negative for dysuria and hematuria.         Current Outpatient Medications:     allopurinol (Zyloprim) 100 MG tablet, Take 1 tablet by mouth Daily., Disp: 30 tablet, Rfl: 11    aluminum hydroxide-mag carbonate (GAVISCON EXTRA RELIEF) 160-105 MG chewable tablet chewable tablet, Chew 2 tablets  At Night As Needed., Disp: , Rfl:     cholecalciferol (VITAMIN D3) 25 MCG (1000 UT) tablet, Take 1 tablet by mouth Daily., Disp: , Rfl:     econazole nitrate (SPECTAZOLE) 1 % cream, 1 Application As Needed., Disp: , Rfl:     ferrous sulfate 325 (65 FE) MG tablet, Take 1 tablet by mouth Daily With Breakfast., Disp: , Rfl:     fluticasone (FLONASE) 50 MCG/ACT nasal spray, 2 sprays into the nostril(s) as directed by provider 2 (Two) Times a Day. (Patient taking differently: Administer 2 sprays into the nostril(s) as directed by provider As Needed.), Disp: 48 g, Rfl: 3    hydroCHLOROthiazide (HYDRODIURIL) 12.5 MG tablet, Take 1 tablet by mouth Every Morning., Disp: , Rfl:     HYDROcodone-acetaminophen (NORCO)  MG per tablet, Take 1 tablet by mouth Every 8 (Eight) Hours As Needed., Disp: , Rfl:     HYDROcodone-acetaminophen (NORCO) 7.5-325 MG per tablet, Take 1 tablet by mouth Every 6 (Six) Hours As Needed for Moderate Pain (Pain)., Disp: 12 tablet, Rfl: 0    ibuprofen (ADVIL,MOTRIN) 200 MG tablet, Take 1 tablet by mouth Every 6 (Six) Hours As Needed for Mild Pain., Disp: , Rfl:     ketorolac (TORADOL) 10 MG tablet, Take 1 tablet by mouth Every 6 (Six) Hours As Needed for Moderate Pain., Disp: 15 tablet, Rfl: 0    lisinopril (PRINIVIL,ZESTRIL) 40 MG tablet, Take 1 tablet by mouth Daily., Disp: , Rfl:     ondansetron (Zofran) 4 MG tablet, Take 1 tablet by mouth Every 8 (Eight) Hours As Needed for Nausea or Vomiting., Disp: 12 tablet, Rfl: 0    ondansetron (ZOFRAN) 8 MG tablet, Take 1 tablet by mouth As Needed for Nausea., Disp: , Rfl:     ondansetron ODT (ZOFRAN-ODT) 4 MG disintegrating tablet, Place 1 tablet on the tongue Every 4 (Four) Hours As Needed for Nausea or Vomiting., Disp: 10 tablet, Rfl: 0    pantoprazole (PROTONIX) 40 MG EC tablet, Take 1 tablet by mouth., Disp: , Rfl:     tamsulosin (FLOMAX) 0.4 MG capsule 24 hr capsule, Take 1 capsule by mouth Daily., Disp: 30 capsule, Rfl: 0    Current  "Facility-Administered Medications:     lidocaine (XYLOCAINE) 1 % injection 5 mL, 5 mL, Infiltration, Once, En Cano MD    lidocaine (XYLOCAINE) 1 % injection 5 mL, 5 mL, Infiltration, Once, En Cano MD    Past Medical History:   Diagnosis Date    Bell's palsy     Disease of thyroid gland 2017    GERD (gastroesophageal reflux disease)     Hypertension     Kidney stone     Pancreatic cyst     PONV (postoperative nausea and vomiting)     Sleep apnea     TMJ dysfunction 2015       Past Surgical History:   Procedure Laterality Date    EXTRACORPOREAL SHOCK WAVE LITHOTRIPSY (ESWL) Left 04/14/2023    Procedure: EXTRACORPOREAL SHOCKWAVE LITHOTRIPSY-left;  Surgeon: Mt Avila MD;  Location:  [facility] OR;  Service: Urology;  Laterality: Left;    EXTRACORPOREAL SHOCK WAVE LITHOTRIPSY (ESWL) Right 05/15/2024    Procedure: EXTRACORPOREAL SHOCKWAVE LITHOTRIPSY-right;  Surgeon: Mt Avila MD;  Location:  [facility] OR;  Service: Urology;  Laterality: Right;    EXTRACORPOREAL SHOCK WAVE LITHOTRIPSY (ESWL) Left 10/2/2024    Procedure: EXTRACORPOREAL SHOCKWAVE LITHOTRIPSY-left;  Surgeon: Mt Avila MD;  Location:  [facility] OR;  Service: Urology;  Laterality: Left;    US GUIDED LYMPH NODE BIOPSY  10/1/2024       Social History     Socioeconomic History    Marital status:    Tobacco Use    Smoking status: Never    Smokeless tobacco: Never   Vaping Use    Vaping status: Never Used   Substance and Sexual Activity    Alcohol use: Never    Drug use: Never    Sexual activity: Defer       Family History   Problem Relation Age of Onset    Diabetes type II Father     Heart disease Father     Diabetes Father     Hypertension Father     Hyperlipidemia Father     Diabetes type II Maternal Grandmother     Diabetes Maternal Grandmother        Objective    Temp 97.5 °F (36.4 °C)   Ht 177.8 cm (70\")   Wt 130 kg (287 lb)   BMI 41.18 kg/m²     Physical Exam        Results for orders placed or performed in visit on " 11/15/24   POC Urinalysis Dipstick, Multipro    Collection Time: 11/15/24  8:28 AM    Specimen: Urine   Result Value Ref Range    Color Yellow Yellow, Straw, Dark Yellow, Vesna    Clarity, UA Clear Clear    Glucose, UA Negative Negative mg/dL    Bilirubin Negative Negative    Ketones, UA Negative Negative    Specific Gravity  1.020 1.005 - 1.030    Blood, UA Negative Negative    pH, Urine 6.5 5.0 - 8.0    Protein, POC Negative Negative mg/dL    Urobilinogen, UA 0.2 E.U./dL Normal, 0.2 E.U./dL    Nitrite, UA Negative Negative    Leukocytes Negative Negative   IMAGING SCANNED (10/16/2024) XR Abdomen KUB (11/15/2024 07:52)   Assessment and Plan    Diagnoses and all orders for this visit:    1. Nephrolithiasis (Primary)  -     POC Urinalysis Dipstick, Multipro  -     XR abdomen kub; Future  -     US Renal Bilateral; Future    2. Kidney stone  -     tamsulosin (FLOMAX) 0.4 MG capsule 24 hr capsule; Take 1 capsule by mouth Daily.  Dispense: 30 capsule; Refill: 0  -     US Renal Bilateral; Future      KUB completed today with a suspicious area along the left proximal ureter.  This area was not previously seen on last KUB imaging as this area is more proximal to the kidney than the previous stones seen.  Based on this given the fact patient has undergone multiple different images lately and is currently being tested and treated for lymphoma hate to keep radiating patient therefore we will treat as a possible stone will start back on Flomax and will have patient return in 3 weeks with renal ultrasound prior

## 2024-11-08 ENCOUNTER — OFFICE VISIT (OUTPATIENT)
Dept: GASTROENTEROLOGY | Age: 44
End: 2024-11-08
Payer: COMMERCIAL

## 2024-11-08 VITALS
HEIGHT: 70 IN | BODY MASS INDEX: 40.94 KG/M2 | OXYGEN SATURATION: 97 % | HEART RATE: 76 BPM | SYSTOLIC BLOOD PRESSURE: 120 MMHG | WEIGHT: 286 LBS | DIASTOLIC BLOOD PRESSURE: 80 MMHG

## 2024-11-08 DIAGNOSIS — K76.0 FATTY LIVER: ICD-10-CM

## 2024-11-08 DIAGNOSIS — D50.9 IRON DEFICIENCY ANEMIA, UNSPECIFIED IRON DEFICIENCY ANEMIA TYPE: ICD-10-CM

## 2024-11-08 DIAGNOSIS — Z12.11 COLON CANCER SCREENING: ICD-10-CM

## 2024-11-08 DIAGNOSIS — D50.9 IRON DEFICIENCY ANEMIA, UNSPECIFIED IRON DEFICIENCY ANEMIA TYPE: Primary | ICD-10-CM

## 2024-11-08 DIAGNOSIS — K62.5 BRIGHT RED BLOOD PER RECTUM: ICD-10-CM

## 2024-11-08 DIAGNOSIS — K21.9 GASTROESOPHAGEAL REFLUX DISEASE, UNSPECIFIED WHETHER ESOPHAGITIS PRESENT: ICD-10-CM

## 2024-11-08 LAB
AFP SERPL-MCNC: 2.5 NG/ML (ref 0–8.3)
ALBUMIN SERPL-MCNC: 4.8 G/DL (ref 3.5–5.2)
ALP SERPL-CCNC: 113 U/L (ref 40–129)
ALT SERPL-CCNC: 27 U/L (ref 5–41)
ANION GAP SERPL CALCULATED.3IONS-SCNC: 12 MMOL/L (ref 7–19)
AST SERPL-CCNC: 21 U/L (ref 5–40)
BILIRUB SERPL-MCNC: 0.4 MG/DL (ref 0.2–1.2)
BUN SERPL-MCNC: 13 MG/DL (ref 6–20)
CALCIUM SERPL-MCNC: 9.9 MG/DL (ref 8.6–10)
CHLORIDE SERPL-SCNC: 100 MMOL/L (ref 98–111)
CO2 SERPL-SCNC: 28 MMOL/L (ref 22–29)
CREAT SERPL-MCNC: 1 MG/DL (ref 0.7–1.2)
GAMMA GLUTAMYL TRANSFERASE: 62 U/L (ref 8–61)
GLUCOSE SERPL-MCNC: 84 MG/DL (ref 70–99)
HAV IGM SERPL QL IA: NORMAL
HBV CORE IGM SERPL QL IA: NORMAL
HBV SURFACE AG SERPL QL IA: NORMAL
HCV AB SERPL QL IA: NORMAL
INR PPP: 1.1 (ref 0.88–1.18)
POTASSIUM SERPL-SCNC: 4 MMOL/L (ref 3.5–5)
PROT SERPL-MCNC: 8.1 G/DL (ref 6.4–8.3)
PROTHROMBIN TIME: 13.9 SEC (ref 12–14.6)
SODIUM SERPL-SCNC: 140 MMOL/L (ref 136–145)

## 2024-11-08 PROCEDURE — 99215 OFFICE O/P EST HI 40 MIN: CPT

## 2024-11-08 RX ORDER — PANTOPRAZOLE SODIUM 40 MG/1
40 TABLET, DELAYED RELEASE ORAL
Qty: 30 TABLET | Refills: 0 | Status: SHIPPED | OUTPATIENT
Start: 2024-11-08

## 2024-11-08 ASSESSMENT — ENCOUNTER SYMPTOMS
CONSTIPATION: 0
ABDOMINAL DISTENTION: 0
DIARRHEA: 0
NAUSEA: 0
SHORTNESS OF BREATH: 0
RESPIRATORY NEGATIVE: 1
RECTAL PAIN: 0
BLOOD IN STOOL: 1
ABDOMINAL PAIN: 0
APNEA: 0
TROUBLE SWALLOWING: 0
CHEST TIGHTNESS: 0
VOICE CHANGE: 0
ALLERGIC/IMMUNOLOGIC NEGATIVE: 1
VOMITING: 0
EYES NEGATIVE: 1

## 2024-11-08 NOTE — PROGRESS NOTES
Subjective:     Patient ID: Oren Carrillo is a 44 y.o. male  PCP: Tex Price MD  Referring Provider: Louis Louis PA-C    HPI  Patient presents to the office today with the following complaints: New Patient and Anemia    Patient seen in the office today for anemia. He states he has never had an EGD or colonoscopy. He states he has had blood in urine occasionally due to kidney stones and a trace on urinalysis at times. He states he has a bowel movement most everyday and it is not usually hard. He states his stools are black due to iron. He did have some bright red blood on the toilet tissue once after surgery after straining to have a bowel movement but never again. He states he has a history of IBS with diarrhea. He has taken bentyl in the past but has not needed it for about 2 years. He also has a history of a 1.6 cm pancreatic cyst that is stable on CT scan. This was incidentally found on a CT in 3/2023. Dr Saucedo is following per patient. He denies ever having a biopsy of it and states he does not want one either. He denies abdominal pain, nausea, or vomiting. He does have a history of fatty liver that we will continue to watch as well.          Last EGD -never  Last Colonoscopy -never  Denies family hx colon cancer/colon polyps     Time spent with patient: approx 45 minutes      Assessment:     1. Iron deficiency anemia, unspecified iron deficiency anemia type    2. Gastroesophageal reflux disease, unspecified whether esophagitis present    3. Colon cancer screening    4. Bright red blood per rectum    5. Fatty liver              Plan:   Lab work today  US liver-fatty liver  Protonix 40 mg daily- RX given  Follow up with me 3 months  Schedule Colonoscopy and EGD  Instruct on bowel prep.   Nothing to eat or drink after midnight the day of the exam.  Unable to drive for 24 hours after the procedure.   No aspirin or nonsteroidal anti-inflammatories for 5 days before procedure.  I have discussed the

## 2024-11-08 NOTE — PATIENT INSTRUCTIONS
liquids. You can drink water, clear juices, clear broths, flavored ice pops, and gelatin (such as Jell-O). Do not eat or drink anything red or purple. This includes grape juice and grape-flavored ice pops. It also includes fruit punch and cherry gelatin.     Drink the \"colon prep\" liquid as your doctor tells you. You will want to stay home, because the liquid will make you go to the bathroom a lot. Your stools will be loose and watery. It's very important to drink all of the liquid. If you have problems drinking it, call your doctor.     Do not eat any solid foods after you drink the colon prep.     Stop drinking clear liquids for a few hours before the test. Your doctor will tell you how many hours this will be.   What happens on the day of the procedure?   Follow the instructions exactly about when to stop eating and drinking. If you don't, your procedure may be canceled. If your doctor told you to take your medicines on the day of the procedure, take them with only a sip of water.     Take a bath or shower before you come in for your procedure. Do not apply lotions, perfumes, deodorants, or nail polish.     Take off all jewelry and piercings. And take out contact lenses, if you wear them.   At the doctor's office or hospital   Bring a picture ID.     You will be kept comfortable and safe by your anesthesia provider. The anesthesia may make you sleep.     You will lie on your back or your side with your knees drawn up toward your belly. The doctor will gently put a gloved finger into your anus. Then the doctor puts the scope in and moves it into your colon. The scope goes in easily because it is lubricated.     The doctor may also use small tools to take tissue samples for a biopsy or to remove polyps. This does not hurt.     The test usually takes 30 to 45 minutes. But it may take longer. It depends on what is found and what is done.   When should you call your doctor?   You have questions or concerns.     You

## 2024-11-15 ENCOUNTER — OFFICE VISIT (OUTPATIENT)
Dept: UROLOGY | Facility: CLINIC | Age: 44
End: 2024-11-15
Payer: COMMERCIAL

## 2024-11-15 ENCOUNTER — TELEPHONE (OUTPATIENT)
Dept: INTERVENTIONAL RADIOLOGY/VASCULAR | Facility: HOSPITAL | Age: 44
End: 2024-11-15

## 2024-11-15 ENCOUNTER — HOSPITAL ENCOUNTER (OUTPATIENT)
Dept: GENERAL RADIOLOGY | Facility: HOSPITAL | Age: 44
Discharge: HOME | End: 2024-11-15
Payer: COMMERCIAL

## 2024-11-15 VITALS — HEIGHT: 70 IN | BODY MASS INDEX: 41.09 KG/M2 | TEMPERATURE: 97.5 F | WEIGHT: 287 LBS

## 2024-11-15 DIAGNOSIS — N20.0 KIDNEY STONE: ICD-10-CM

## 2024-11-15 DIAGNOSIS — N20.0 NEPHROLITHIASIS: Primary | ICD-10-CM

## 2024-11-15 DIAGNOSIS — N20.0 NEPHROLITHIASIS: ICD-10-CM

## 2024-11-15 LAB
BILIRUB BLD-MCNC: NEGATIVE MG/DL
CLARITY, POC: CLEAR
COLOR UR: YELLOW
GLUCOSE UR STRIP-MCNC: NEGATIVE MG/DL
KETONES UR QL: NEGATIVE
LEUKOCYTE EST, POC: NEGATIVE
NITRITE UR-MCNC: NEGATIVE MG/ML
PH UR: 6.5 [PH] (ref 5–8)
PROT UR STRIP-MCNC: NEGATIVE MG/DL
RBC # UR STRIP: NEGATIVE /UL
SP GR UR: 1.02 (ref 1–1.03)
UROBILINOGEN UR QL: NORMAL

## 2024-11-15 PROCEDURE — 74018 RADEX ABDOMEN 1 VIEW: CPT

## 2024-11-15 RX ORDER — TAMSULOSIN HYDROCHLORIDE 0.4 MG/1
1 CAPSULE ORAL DAILY
Qty: 30 CAPSULE | Refills: 0 | Status: SHIPPED | OUTPATIENT
Start: 2024-11-15

## 2024-11-15 RX ORDER — PANTOPRAZOLE SODIUM 40 MG/1
40 TABLET, DELAYED RELEASE ORAL
COMMUNITY
Start: 2024-11-08

## 2024-11-18 ENCOUNTER — HOSPITAL ENCOUNTER (OUTPATIENT)
Dept: ULTRASOUND IMAGING | Age: 44
Discharge: HOME | End: 2024-11-18
Payer: COMMERCIAL

## 2024-11-18 DIAGNOSIS — K76.0 FATTY LIVER: ICD-10-CM

## 2024-11-18 DIAGNOSIS — D50.9 IRON DEFICIENCY ANEMIA, UNSPECIFIED IRON DEFICIENCY ANEMIA TYPE: ICD-10-CM

## 2024-11-18 PROCEDURE — 76705 ECHO EXAM OF ABDOMEN: CPT

## 2024-12-04 ENCOUNTER — HOSPITAL ENCOUNTER (OUTPATIENT)
Dept: ULTRASOUND IMAGING | Facility: HOSPITAL | Age: 44
Discharge: HOME | End: 2024-12-04
Payer: COMMERCIAL

## 2024-12-04 DIAGNOSIS — N20.0 NEPHROLITHIASIS: ICD-10-CM

## 2024-12-04 DIAGNOSIS — N20.0 KIDNEY STONE: ICD-10-CM

## 2024-12-04 PROCEDURE — 76775 US EXAM ABDO BACK WALL LIM: CPT

## 2024-12-04 NOTE — PROGRESS NOTES
Subjective    Mr. Spencer is 44 y.o. male    Chief Complaint: Follow-up renal ultrasound prior    History of Present Illness    44-year-old male established patient in for follow-up to review renal ultrasound after was found to have suspicious area in the left proximal ureter region possibly representing a ureteral stone.  Patient continues to deny any flank pain.  Has not seen any stone passage.  Patient is s/p left ESWL Dr. Avila 10/2/2024 due to a 6 mm stone left proximal ureter.  Patient reports initially passed a few small fragments.  Later underwent CT with and without contrast through [facility] due to a pancreatic cyst found incidentally CT urogram showed no further stone or hydronephrosis and ureter.  Patient with multiple stones remaining left kidney and a few within the right.       Prior requiring surgical intervention right ESWL 5/2024 Dr. Avila due to a 4 mm right proximal ureteral stone that failed to pass.      History of calcium oxalate stone disease. 24-hour urine metabolic evaluation it revealed elevated uric acid along with serum elevation of uric acid. started on daily allopurinol 100 mg.  Patient also with hypocitraturia and low calcium oxalate saturation.       Is currently followed by Dr. Cano's office where patient is undergoing multiple labs.  Experiencing chronic anemia.  Reports they are currently concern for possible CML.      The following portions of the patient's history were reviewed and updated as appropriate: allergies, current medications, past family history, past medical history, past social history, past surgical history and problem list.    Review of Systems   Constitutional:  Negative for chills and fever.   Gastrointestinal:  Negative for abdominal pain, anal bleeding and blood in stool.   Genitourinary:  Negative for dysuria and hematuria.         Current Outpatient Medications:     allopurinol (Zyloprim) 100 MG tablet, Take 1 tablet by mouth Daily., Disp: 30 tablet,  Rfl: 11    aluminum hydroxide-mag carbonate (GAVISCON EXTRA RELIEF) 160-105 MG chewable tablet chewable tablet, Chew 2 tablets At Night As Needed., Disp: , Rfl:     cholecalciferol (VITAMIN D3) 25 MCG (1000 UT) tablet, Take 1 tablet by mouth Daily., Disp: , Rfl:     econazole nitrate (SPECTAZOLE) 1 % cream, 1 Application As Needed., Disp: , Rfl:     ferrous sulfate 325 (65 FE) MG tablet, Take 1 tablet by mouth Daily With Breakfast., Disp: , Rfl:     fluticasone (FLONASE) 50 MCG/ACT nasal spray, 2 sprays into the nostril(s) as directed by provider 2 (Two) Times a Day. (Patient taking differently: Administer 2 sprays into the nostril(s) as directed by provider As Needed.), Disp: 48 g, Rfl: 3    hydroCHLOROthiazide (HYDRODIURIL) 12.5 MG tablet, Take 1 tablet by mouth Every Morning., Disp: , Rfl:     HYDROcodone-acetaminophen (NORCO)  MG per tablet, Take 1 tablet by mouth Every 8 (Eight) Hours As Needed., Disp: , Rfl:     HYDROcodone-acetaminophen (NORCO) 7.5-325 MG per tablet, Take 1 tablet by mouth Every 6 (Six) Hours As Needed for Moderate Pain (Pain)., Disp: 12 tablet, Rfl: 0    ibuprofen (ADVIL,MOTRIN) 200 MG tablet, Take 1 tablet by mouth Every 6 (Six) Hours As Needed for Mild Pain., Disp: , Rfl:     ketorolac (TORADOL) 10 MG tablet, Take 1 tablet by mouth Every 6 (Six) Hours As Needed for Moderate Pain., Disp: 15 tablet, Rfl: 0    lisinopril (PRINIVIL,ZESTRIL) 40 MG tablet, Take 1 tablet by mouth Daily., Disp: , Rfl:     ondansetron (Zofran) 4 MG tablet, Take 1 tablet by mouth Every 8 (Eight) Hours As Needed for Nausea or Vomiting., Disp: 12 tablet, Rfl: 0    ondansetron (ZOFRAN) 8 MG tablet, Take 1 tablet by mouth As Needed for Nausea., Disp: , Rfl:     ondansetron ODT (ZOFRAN-ODT) 4 MG disintegrating tablet, Place 1 tablet on the tongue Every 4 (Four) Hours As Needed for Nausea or Vomiting., Disp: 10 tablet, Rfl: 0    pantoprazole (PROTONIX) 40 MG EC tablet, Take 1 tablet by mouth., Disp: , Rfl:      "tamsulosin (FLOMAX) 0.4 MG capsule 24 hr capsule, Take 1 capsule by mouth Daily., Disp: 30 capsule, Rfl: 0    Current Facility-Administered Medications:     lidocaine (XYLOCAINE) 1 % injection 5 mL, 5 mL, Infiltration, Once, En Cano MD    lidocaine (XYLOCAINE) 1 % injection 5 mL, 5 mL, Infiltration, Once, En Cano MD    Past Medical History:   Diagnosis Date    Bell's palsy     Disease of thyroid gland 2017    GERD (gastroesophageal reflux disease)     Hypertension     Kidney stone     Pancreatic cyst     PONV (postoperative nausea and vomiting)     Sleep apnea     TMJ dysfunction 2015       Past Surgical History:   Procedure Laterality Date    EXTRACORPOREAL SHOCK WAVE LITHOTRIPSY (ESWL) Left 04/14/2023    Procedure: EXTRACORPOREAL SHOCKWAVE LITHOTRIPSY-left;  Surgeon: Mt Avila MD;  Location: [facility] OR;  Service: Urology;  Laterality: Left;    EXTRACORPOREAL SHOCK WAVE LITHOTRIPSY (ESWL) Right 05/15/2024    Procedure: EXTRACORPOREAL SHOCKWAVE LITHOTRIPSY-right;  Surgeon: Mt Avila MD;  Location: [facility] OR;  Service: Urology;  Laterality: Right;    EXTRACORPOREAL SHOCK WAVE LITHOTRIPSY (ESWL) Left 10/2/2024    Procedure: EXTRACORPOREAL SHOCKWAVE LITHOTRIPSY-left;  Surgeon: Mt Avila MD;  Location: [facility] OR;  Service: Urology;  Laterality: Left;    US GUIDED LYMPH NODE BIOPSY  10/1/2024       Social History     Socioeconomic History    Marital status:    Tobacco Use    Smoking status: Never    Smokeless tobacco: Never   Vaping Use    Vaping status: Never Used   Substance and Sexual Activity    Alcohol use: Never    Drug use: Never    Sexual activity: Defer       Family History   Problem Relation Age of Onset    Diabetes type II Father     Heart disease Father     Diabetes Father     Hypertension Father     Hyperlipidemia Father     Diabetes type II Maternal Grandmother     Diabetes Maternal Grandmother        Objective    Temp 97.5 °F (36.4 °C)   Ht 177.8 cm (70\")  "  Wt 130 kg (287 lb)   BMI 41.18 kg/m²     Physical Exam        Results for orders placed or performed in visit on 12/06/24   POC Urinalysis Dipstick, Multipro    Collection Time: 12/06/24  2:02 PM    Specimen: Urine   Result Value Ref Range    Color Yellow Yellow, Straw, Dark Yellow, Vesna    Clarity, UA Clear Clear    Glucose, UA Negative Negative mg/dL    Bilirubin Negative Negative    Ketones, UA Negative Negative    Specific Gravity  1.015 1.005 - 1.030    Blood, UA Negative Negative    pH, Urine 7.0 5.0 - 8.0    Protein, POC Negative Negative mg/dL    Urobilinogen, UA 0.2 E.U./dL Normal, 0.2 E.U./dL    Nitrite, UA Negative Negative    Leukocytes Negative Negative   US Renal Bilateral (12/04/2024 09:41)   Assessment and Plan    Diagnoses and all orders for this visit:    1. Kidney stone (Primary)  -     POC Urinalysis Dipstick, Multipro  -     [facility] 24Hr Urine Panel -; Future  -     XR Abdomen KUB; Future      Renal ultrasound completed revealing no hydronephrosis seen.  Patient with bilateral renal stones as nonobstructing.    Based on this finding recommend continuing to monitor patient's stone burden.  Would like to obtain repeat 24-hour urine metabolic evaluation as patient remains on allopurinol and is faithfully drinking lemonade lemon water    Follow-up 6 months KUB prior

## 2024-12-06 ENCOUNTER — OFFICE VISIT (OUTPATIENT)
Dept: UROLOGY | Facility: CLINIC | Age: 44
End: 2024-12-06
Payer: COMMERCIAL

## 2024-12-06 VITALS — HEIGHT: 70 IN | TEMPERATURE: 97.5 F | BODY MASS INDEX: 41.09 KG/M2 | WEIGHT: 287 LBS

## 2024-12-06 DIAGNOSIS — N20.0 KIDNEY STONE: Primary | ICD-10-CM

## 2024-12-06 LAB
BILIRUB BLD-MCNC: NEGATIVE MG/DL
CLARITY, POC: CLEAR
COLOR UR: YELLOW
GLUCOSE UR STRIP-MCNC: NEGATIVE MG/DL
KETONES UR QL: NEGATIVE
LEUKOCYTE EST, POC: NEGATIVE
NITRITE UR-MCNC: NEGATIVE MG/ML
PH UR: 7 [PH] (ref 5–8)
PROT UR STRIP-MCNC: NEGATIVE MG/DL
RBC # UR STRIP: NEGATIVE /UL
SP GR UR: 1.01 (ref 1–1.03)
UROBILINOGEN UR QL: NORMAL

## 2024-12-09 ENCOUNTER — HOSPITAL ENCOUNTER (OUTPATIENT)
Age: 44
Setting detail: OUTPATIENT SURGERY
Discharge: HOME | End: 2024-12-09
Attending: INTERNAL MEDICINE | Admitting: INTERNAL MEDICINE
Payer: COMMERCIAL

## 2024-12-09 ENCOUNTER — ANCILLARY PROCEDURE (OUTPATIENT)
Dept: ENDOSCOPY | Age: 44
End: 2024-12-09
Attending: INTERNAL MEDICINE
Payer: COMMERCIAL

## 2024-12-09 ENCOUNTER — ANESTHESIA EVENT (OUTPATIENT)
Dept: ENDOSCOPY | Age: 44
End: 2024-12-09
Payer: COMMERCIAL

## 2024-12-09 ENCOUNTER — ANESTHESIA (OUTPATIENT)
Dept: ENDOSCOPY | Age: 44
End: 2024-12-09
Payer: COMMERCIAL

## 2024-12-09 VITALS
BODY MASS INDEX: 40.8 KG/M2 | WEIGHT: 285 LBS | RESPIRATION RATE: 18 BRPM | DIASTOLIC BLOOD PRESSURE: 65 MMHG | HEART RATE: 90 BPM | OXYGEN SATURATION: 96 % | HEIGHT: 70 IN | SYSTOLIC BLOOD PRESSURE: 118 MMHG | TEMPERATURE: 97.9 F

## 2024-12-09 DIAGNOSIS — Z12.11 SCREEN FOR COLON CANCER: ICD-10-CM

## 2024-12-09 DIAGNOSIS — K21.9 GASTROESOPHAGEAL REFLUX DISEASE, UNSPECIFIED WHETHER ESOPHAGITIS PRESENT: ICD-10-CM

## 2024-12-09 DIAGNOSIS — D64.9 ANEMIA, UNSPECIFIED TYPE: ICD-10-CM

## 2024-12-09 PROCEDURE — 3609027000 HC COLONOSCOPY: Performed by: INTERNAL MEDICINE

## 2024-12-09 PROCEDURE — 43239 EGD BIOPSY SINGLE/MULTIPLE: CPT | Performed by: INTERNAL MEDICINE

## 2024-12-09 PROCEDURE — 3700000001 HC ADD 15 MINUTES (ANESTHESIA): Performed by: INTERNAL MEDICINE

## 2024-12-09 PROCEDURE — 2580000003 HC RX 258: Performed by: INTERNAL MEDICINE

## 2024-12-09 PROCEDURE — 45378 DIAGNOSTIC COLONOSCOPY: CPT | Performed by: INTERNAL MEDICINE

## 2024-12-09 PROCEDURE — 7100000010 HC PHASE II RECOVERY - FIRST 15 MIN: Performed by: INTERNAL MEDICINE

## 2024-12-09 PROCEDURE — 2709999900 HC NON-CHARGEABLE SUPPLY: Performed by: INTERNAL MEDICINE

## 2024-12-09 PROCEDURE — 7100000011 HC PHASE II RECOVERY - ADDTL 15 MIN: Performed by: INTERNAL MEDICINE

## 2024-12-09 PROCEDURE — 6360000002 HC RX W HCPCS: Performed by: NURSE ANESTHETIST, CERTIFIED REGISTERED

## 2024-12-09 PROCEDURE — 88342 IMHCHEM/IMCYTCHM 1ST ANTB: CPT

## 2024-12-09 PROCEDURE — 88305 TISSUE EXAM BY PATHOLOGIST: CPT

## 2024-12-09 PROCEDURE — 3609012400 HC EGD TRANSORAL BIOPSY SINGLE/MULTIPLE: Performed by: INTERNAL MEDICINE

## 2024-12-09 PROCEDURE — 3700000000 HC ANESTHESIA ATTENDED CARE: Performed by: INTERNAL MEDICINE

## 2024-12-09 RX ORDER — SODIUM CHLORIDE, SODIUM LACTATE, POTASSIUM CHLORIDE, CALCIUM CHLORIDE 600; 310; 30; 20 MG/100ML; MG/100ML; MG/100ML; MG/100ML
INJECTION, SOLUTION INTRAVENOUS CONTINUOUS
Status: DISCONTINUED | OUTPATIENT
Start: 2024-12-09 | End: 2024-12-09 | Stop reason: HOSPADM

## 2024-12-09 RX ORDER — PROPOFOL 10 MG/ML
INJECTION, EMULSION INTRAVENOUS
Status: DISCONTINUED | OUTPATIENT
Start: 2024-12-09 | End: 2024-12-09 | Stop reason: SDUPTHER

## 2024-12-09 RX ORDER — LIDOCAINE HYDROCHLORIDE 10 MG/ML
INJECTION, SOLUTION INFILTRATION; PERINEURAL
Status: DISCONTINUED | OUTPATIENT
Start: 2024-12-09 | End: 2024-12-09 | Stop reason: SDUPTHER

## 2024-12-09 RX ORDER — ONDANSETRON 2 MG/ML
INJECTION INTRAMUSCULAR; INTRAVENOUS
Status: DISCONTINUED | OUTPATIENT
Start: 2024-12-09 | End: 2024-12-09 | Stop reason: SDUPTHER

## 2024-12-09 RX ORDER — MIDAZOLAM HYDROCHLORIDE 1 MG/ML
INJECTION, SOLUTION INTRAMUSCULAR; INTRAVENOUS
Status: DISCONTINUED | OUTPATIENT
Start: 2024-12-09 | End: 2024-12-09 | Stop reason: SDUPTHER

## 2024-12-09 RX ORDER — FENTANYL CITRATE 50 UG/ML
INJECTION, SOLUTION INTRAMUSCULAR; INTRAVENOUS
Status: DISCONTINUED | OUTPATIENT
Start: 2024-12-09 | End: 2024-12-09 | Stop reason: SDUPTHER

## 2024-12-09 RX ORDER — TAMSULOSIN HYDROCHLORIDE 0.4 MG/1
0.4 CAPSULE ORAL PRN
COMMUNITY

## 2024-12-09 RX ADMIN — MIDAZOLAM 2 MG: 1 INJECTION INTRAMUSCULAR; INTRAVENOUS at 10:46

## 2024-12-09 RX ADMIN — ONDANSETRON 4 MG: 2 INJECTION INTRAMUSCULAR; INTRAVENOUS at 10:45

## 2024-12-09 RX ADMIN — FENTANYL CITRATE 100 MCG: 50 INJECTION INTRAMUSCULAR; INTRAVENOUS at 10:46

## 2024-12-09 RX ADMIN — FENTANYL CITRATE 50 MCG: 50 INJECTION INTRAMUSCULAR; INTRAVENOUS at 11:19

## 2024-12-09 RX ADMIN — PROPOFOL 300 MG: 10 INJECTION, EMULSION INTRAVENOUS at 10:51

## 2024-12-09 RX ADMIN — LIDOCAINE HYDROCHLORIDE 40 MG: 10 INJECTION, SOLUTION INFILTRATION; PERINEURAL at 10:51

## 2024-12-09 RX ADMIN — SODIUM CHLORIDE, POTASSIUM CHLORIDE, SODIUM LACTATE AND CALCIUM CHLORIDE: 600; 310; 30; 20 INJECTION, SOLUTION INTRAVENOUS at 09:29

## 2024-12-09 ASSESSMENT — PAIN - FUNCTIONAL ASSESSMENT
PAIN_FUNCTIONAL_ASSESSMENT: NONE - DENIES PAIN
PAIN_FUNCTIONAL_ASSESSMENT: NONE - DENIES PAIN

## 2024-12-09 NOTE — DISCHARGE INSTRUCTIONS
1.  Await path results, the patient will be contacted in 7-10 days with biopsy results.   2.  Continue empirical treatment aimed at GERD along with anti-GERD measures  3.  Continue attempts to lose weight towards his ideal BMI in the context of his fatty liver, GERD and other GI issues as well as from a cardiovascular standpoint.  4. Repeat colonoscopy: Based on colonoscopy findings today and prep quality-in 5 years for cancer screening; sooner if his personal or family history as pertaining to colorectal cancer risk changes requiring an earlier exam or if the patient were to develop lower GI symptoms such as bleeding, abdominal pain, change in bowel habits or stool caliber or if the patient has anemia or unexplained weight loss in the future.   5. - Resume previous meds and low-fat low-carb low calorie but fiber and protein rich diet  - GI clinic f/u 6-8 weeks with Ms. Bah    - Keep scheduled f/u appts with other MDs   - NO NSAIDs x 2 weeks      Upper GI Endoscopy and Colonoscopy: What to Expect at Home  Your Recovery    After an upper GI endoscopy, you may have a sore throat for a day or two after the test.    After a colonoscopy you may be bloated or have gas pains. You may need to pass gas. If a biopsy was done or a polyp was removed, you may have streaks of blood in your stool (feces) for a few days. Problems such as heavy rectal bleeding may not occur until several weeks after the test. This isn't common. But it can happen after polyps are removed.    How can you care for yourself at home?  Activity   Rest as much as you need to after you go home.  You should be able to go back to your usual activities the day after the test.  You should not drive or operate equipment for the remainder of today.    Diet   Follow your doctor's directions for eating after the test.  Unless your doctor has told you not to, drink plenty of fluids. This helps to replace the fluids that were lost during the colon prep.  Do not

## 2024-12-09 NOTE — OP NOTE
Endoscopic Procedure Note    Patient: Oren Carrillo : 1980  Med Rec#: [id number] Acc#: [id number]     Primary Care Provider Tex Price MD    Endoscopist: Brian Franklin MD, MD    Date of Procedure:  2024    Procedure:   EGD with    Cold biopsies    Indications:     For both EGD and colonoscopy exams today:    1. Iron deficiency anemia, unspecified iron deficiency anemia type    2. Gastroesophageal reflux disease, unspecified whether esophagitis present    3. Colon cancer screening    4. Bright red blood per rectum    5. Fatty liver    6.  Past history history of intermittent dysphagia with cervical lymphadenopathy    Anesthesia:  Sedation was administered by anesthesia who monitored the patient during the procedure.    Estimated Blood Loss: minimal    Procedure:   After reviewing the patient's chart and obtaining informed consent, the patient was placed in the left lateral decubitus position.  A forward-viewing Olympus endoscope was lubricated and inserted through the mouth into the oropharynx. Under direct visualization, the upper esophagus was intubated. The scope was advanced to the level of the third portion of duodenum. Scope was slowly withdrawn with careful inspection of the mucosal surfaces. The scope was retroflexed for inspection of the gastric fundus and incisura. Findings and maneuvers are listed in impression below. The patient tolerated the procedure well. The scope was removed. There were no immediate complications.    Findings/IMPRESSION:  Esophagus: normal and a normal EG junction at 40 cm.    NO erosions or ulcers or nodules or strictures or webs or rings or mass lesions or extrinsic compression or diverticula noted.Random cold biopsies were taken to check for EoE and NERD.     There is no obvious hiatal hernia present.      Stomach:  abnormal: Few small 3 to 5 mm in diameter benign-appearing sessile polyps were noted in the fundus and body likely hyperplastic

## 2024-12-09 NOTE — OP NOTE
Patient: Oren Carrillo : 1980  Med Rec#: [id number] Acc#: [id number]   Primary Care Provider Tex Price MD    Date of Procedure:  2024    Endoscopist: Brian Franklin MD, MD    Referring Provider: Tex Price MD,     Operation Performed: Colonoscopy up to the cecum.  Difficult technically challenging exam due to a tortuous redundant colon and patient's obesity requiring external abdominal pressure and change in patient's body position from left lateral decubitus to supine and back to left lateral decubitus.    Indications:   For both EGD and colonoscopy exams today:    1. Iron deficiency anemia, unspecified iron deficiency anemia type    2. Gastroesophageal reflux disease, unspecified whether esophagitis present    3. Colon cancer screening    4. Bright red blood per rectum    5. Fatty liver        Anesthesia:  Sedation was administered by anesthesia who monitored the patient during the procedure.    I met with Oren Carrillo prior to procedure. We discussed the procedure itself, and I have discussed the risks of endoscopy (including-- but not limited to-- pain, discomfort, bleeding potentially requiring second endoscopic procedure and/or blood transfusion, organ perforation requiring operative repair, damage to organs near the colon, infection, aspiration, cardiopulmonary/allergic reaction), benefits, indications to endoscopy. Additionally, we discussed options other than colonoscopy. The patient expressed understanding. All questions answered. The patient decided to proceed with the procedure.  Signed informed consent was placed on the chart.    Blood Loss: minimal    Withdrawal time: More than 9 minutes  Bowel Prep: Fair  with small amounts of thick d semisolid stool and a moderate amount of thick, opaque liquid scattered in patchy segments throughout the colon obscuring the underlying mucosa.Lesions including polyps may have been missed.     Complications: no immediate

## 2024-12-09 NOTE — H&P
Surgical History:  Past Surgical History:   Procedure Laterality Date    EXTRACORPOREAL SHOCK WAVE LITHOTRIPSY Right 04/17/2023    EXTRACORPOREAL SHOCK WAVE LITHOTRIPSY  10/02/2024    KIDNEY STONE REMOVAL  05/15/2024    KIDNEY STONE REMOVAL  10/02/2024    ADELA NEEDLE BIOPSY LYMPH NODE SUPERFICIAL  10/01/2024       Social History:  Social History     Tobacco Use    Smoking status: Never    Smokeless tobacco: Never   Vaping Use    Vaping status: Never Used   Substance Use Topics    Alcohol use: Not Currently    Drug use: Never       Vital Signs:   Vitals:    12/09/24 0912   BP: 139/83   Pulse: 87   Resp: 20   Temp: 98 °F (36.7 °C)   SpO2: 96%        Physical Exam:  Cardiac:  [x]WNL  []Comments:  Pulmonary:  [x]WNL   []Comments:  Neuro/Mental Status:  [x]WNL  []Comments:  Abdominal:  [x]WNL    []Comments:  Other:   []WNL  []Comments:    Informed Consent:  The risks and benefits of the procedure have been discussed with either the patient or if they cannot consent, their representative.    Assessment:  Patient examined and appropriate for planned sedation and procedure.     Plan:  Proceed with planned sedation and procedure as above.         Brian Franklin MD

## 2024-12-09 NOTE — ANESTHESIA POSTPROCEDURE EVALUATION
Department of Anesthesiology  Postprocedure Note    Patient: Oren Carrillo  MRN: [id number]  YOB: 1980  Date of evaluation: 12/9/2024    Procedure Summary       Date: 12/09/24 Room / Location: [place] [id number] / [facility]    Anesthesia Start: 1042 Anesthesia Stop: 1127    Procedures:       ESOPHAGOGASTRODUODENOSCOPY BIOPSY (Abdomen)      COLORECTAL CANCER SCREENING, NOT HIGH RISK Diagnosis:       Anemia, unspecified type      Gastroesophageal reflux disease, unspecified whether esophagitis present      Screen for colon cancer      (Anemia, unspecified type [D64.9])      (Gastroesophageal reflux disease, unspecified whether esophagitis present [K21.9])      (Screen for colon cancer [Z12.11])    Surgeons: Brian Franklin MD Responsible Provider: Momo Johnson APRN - CRNA    Anesthesia Type: general, TIVA ASA Status: 3            Anesthesia Type: No value filed.    Anna Phase I: Anna Score: 10    Anna Phase II:      Anesthesia Post Evaluation    Patient location during evaluation: bedside  Patient participation: complete - patient participated  Level of consciousness: sleepy but conscious  Pain score: 0  Airway patency: patent  Nausea & Vomiting: no nausea and no vomiting  Cardiovascular status: hemodynamically stable and blood pressure returned to baseline  Respiratory status: acceptable and nasal cannula  Hydration status: stable  Pain management: adequate    No notable events documented.

## 2024-12-09 NOTE — ANESTHESIA PRE PROCEDURE
Department of Anesthesiology  Preprocedure Note       Name:  Oren Carrillo   Age:  44 y.o.  :  1980                                          MRN:  [id number]         Date:  2024      Surgeon: Surgeon(s):  Brian Franklin MD    Procedure: Procedure(s):  ESOPHAGOGASTRODUODENOSCOPY BIOPSY  COLORECTAL CANCER SCREENING, NOT HIGH RISK    Medications prior to admission:   Prior to Admission medications    Medication Sig Start Date End Date Taking? Authorizing Provider   tamsulosin (FLOMAX) 0.4 MG capsule Take 1 capsule by mouth as needed (Difficulty urinating)   Yes Kiya Gomez MD   pantoprazole (PROTONIX) 40 MG tablet Take 1 tablet by mouth every morning (before breakfast) 24  Yes Sushma Bah APRN - CNP   allopurinol (ZYLOPRIM) 100 MG tablet Take 1 tablet by mouth daily 24  Yes Kiya Gomez MD   Alum Hydroxide-Mag Carbonate (GAVISCON PO) Take 2 tablets by mouth daily   Yes Kiya Gomez MD   fluticasone (FLONASE) 50 MCG/ACT nasal spray 2 sprays by Each Nostril route as needed for Allergies   Yes ProviderKiya MD   acyclovir (ZOVIRAX) 400 MG tablet Take 1 tablet by mouth as needed As needed for [place] Palsy 19  Yes Kiya Gomez MD   ketorolac (TORADOL) 10 MG tablet Take 1 tablet by mouth every 6 hours as needed Prn for kidney stones 23  Yes Kiya Gomez MD   promethazine-dextromethorphan (PROMETHAZINE-DM) 6.25-15 MG/5ML syrup Take 5 mLs by mouth 3 times daily as needed 19  Yes Kiya Gomez MD   Fexofenadine-Pseudoephedrine (ALLEGRA-D 12 HOUR PO) Take 1 tablet by mouth as needed   Yes Kiya Gomez MD   Cholecalciferol (VITAMIN D3) 1.25 MG (37008 UT) CAPS Take 1 capsule by mouth daily   Yes Kiay Gomez MD   ondansetron (ZOFRAN) 8 MG tablet Take 1 tablet by mouth every 8 hours as needed for Nausea or Vomiting   Yes Kiya Gomez MD   HYDROcodone-acetaminophen (NORCO)  MG per tablet Take

## 2024-12-11 ENCOUNTER — TELEPHONE (OUTPATIENT)
Dept: GASTROENTEROLOGY | Age: 44
End: 2024-12-11

## 2024-12-11 DIAGNOSIS — K21.9 GASTROESOPHAGEAL REFLUX DISEASE, UNSPECIFIED WHETHER ESOPHAGITIS PRESENT: ICD-10-CM

## 2024-12-11 RX ORDER — PANTOPRAZOLE SODIUM 40 MG/1
40 TABLET, DELAYED RELEASE ORAL
Qty: 30 TABLET | Refills: 11 | Status: SHIPPED | OUTPATIENT
Start: 2024-12-11

## 2024-12-11 NOTE — TELEPHONE ENCOUNTER
----- Message from Dr. Brian Franklin MD sent at 12/11/2024  9:35 AM CST -----  No sprue or H. pylori.  GERD noted.  Gastric polyps were benign      12- Called and notified patient of Pathology results as per Dr Franklin, Cv       Patient stated that he is needing refills on his Omeprazole sent to [facility]     Routed to  APRN

## 2025-02-05 NOTE — PROGRESS NOTES
from [facility]    Abuse Screen     Feels Unsafe at Home or Work/School: no     Feels Threatened by Someone: no     Does Anyone Try to Keep You From Having Contact with Others or Doing Things Outside Your Home?: no     Physical Signs of Abuse Present: no   Housing Stability: Unknown (5/9/2024)    Received from [facility], [facility]    Housing Stability     Current Living Arrangements: home       Current Outpatient Medications   Medication Sig Dispense Refill    hydrocortisone (ANUSOL-HC) 2.5 % CREA rectal cream May use twice daily after bowel movement and at bedtime as needed 1 each 1    pantoprazole (PROTONIX) 40 MG tablet Take 1 tablet by mouth every morning (before breakfast) 30 tablet 11    tamsulosin (FLOMAX) 0.4 MG capsule Take 1 capsule by mouth as needed (Difficulty urinating)      allopurinol (ZYLOPRIM) 100 MG tablet Take 1 tablet by mouth daily      ferrous sulfate (IRON 325) 325 (65 Fe) MG tablet Take 1 tablet by mouth daily (with breakfast) 30 tablet 1    Alum Hydroxide-Mag Carbonate (GAVISCON PO) Take 2 tablets by mouth daily      fluticasone (FLONASE) 50 MCG/ACT nasal spray 2 sprays by Each Nostril route as needed for Allergies      acyclovir (ZOVIRAX) 400 MG tablet Take 1 tablet by mouth as needed As needed for [place] Palsy      econazole nitrate 1 % cream Apply 1 Application topically as needed      ketorolac (TORADOL) 10 MG tablet Take 1 tablet by mouth every 6 hours as needed Prn for kidney stones      promethazine-dextromethorphan (PROMETHAZINE-DM) 6.25-15 MG/5ML syrup Take 5 mLs by mouth 3 times daily as needed      Fexofenadine-Pseudoephedrine (ALLEGRA-D 12 HOUR PO) Take 1 tablet by mouth as needed      Cholecalciferol (VITAMIN D3) 1.25 MG (72174 UT) CAPS Take 1 capsule by mouth daily      ondansetron (ZOFRAN) 8 MG tablet Take 1 tablet by mouth every 8 hours as needed for Nausea or Vomiting      HYDROcodone-acetaminophen (NORCO)  MG per tablet

## 2025-02-07 ENCOUNTER — OFFICE VISIT (OUTPATIENT)
Dept: GASTROENTEROLOGY | Age: 45
End: 2025-02-07
Payer: COMMERCIAL

## 2025-02-07 VITALS
HEART RATE: 79 BPM | SYSTOLIC BLOOD PRESSURE: 118 MMHG | WEIGHT: 289 LBS | OXYGEN SATURATION: 98 % | DIASTOLIC BLOOD PRESSURE: 70 MMHG | BODY MASS INDEX: 41.37 KG/M2 | HEIGHT: 70 IN

## 2025-02-07 DIAGNOSIS — K76.0 FATTY LIVER: ICD-10-CM

## 2025-02-07 DIAGNOSIS — K64.9 HEMORRHOIDS, UNSPECIFIED HEMORRHOID TYPE: ICD-10-CM

## 2025-02-07 DIAGNOSIS — K29.00 ACUTE SUPERFICIAL GASTRITIS WITHOUT HEMORRHAGE: Primary | ICD-10-CM

## 2025-02-07 DIAGNOSIS — K64.0 GRADE I INTERNAL HEMORRHOIDS: ICD-10-CM

## 2025-02-07 DIAGNOSIS — D13.1 BENIGN FUNDIC GLAND POLYPS OF STOMACH: ICD-10-CM

## 2025-02-07 PROCEDURE — 99214 OFFICE O/P EST MOD 30 MIN: CPT

## 2025-02-07 RX ORDER — HYDROCORTISONE 25 MG/G
CREAM TOPICAL
Qty: 1 EACH | Refills: 1 | Status: SHIPPED | OUTPATIENT
Start: 2025-02-07

## 2025-02-07 NOTE — PATIENT INSTRUCTIONS
Patient Education        Nonalcoholic Fatty Liver Disease (NAFLD): Care Instructions  Overview     Nonalcoholic fatty liver disease (NAFLD) is the term for conditions in which fat builds up in the liver in people who drink little or no alcohol. Of those who have this condition:  Most have a type called nonalcoholic fatty liver (NAFL). These people have fat in their liver, but it doesn't seem to cause damage.  Some have a more serious type called nonalcoholic steatohepatitis (NELSON). The buildup of fat in the liver causes inflammation and damage. Over time, this can cause scarring of the liver, which can lead to cirrhosis and liver failure.  Experts don't really know what causes fat buildup in the liver, but being obese seems to increase the risk. NAFLD is often linked to a group of health problems called metabolic syndrome. This includes obesity, high cholesterol, high blood pressure, and insulin resistance or diabetes. Most people who have NAFLD also have one or more of these health problems. But some have none of these other conditions.  NAFLD usually doesn't cause symptoms. It can be diagnosed with blood tests and imaging tests, such as a CT scan, an ultrasound, or an MRI. In some cases, a liver biopsy may be done.  Treatment focuses on managing related conditions like diabetes and making lifestyle changes, including losing weight if needed, eating a healthy diet, and being more active. A doctor may prescribe medicines for related conditions or to help with weight loss. Weight-loss surgery may be an option for people who have obesity.  Follow-up care is a key part of your treatment and safety. Be sure to make and go to all appointments, and call your doctor if you are having problems. It's also a good idea to know your test results and keep a list of the medicines you take.  How can you care for yourself at home?  Lose weight if you need to. Losing even 5% of your weight can make a difference to your health. A

## 2025-04-29 ENCOUNTER — TELEPHONE (OUTPATIENT)
Dept: HEMATOLOGY | Age: 45
End: 2025-04-29

## 2025-04-29 NOTE — TELEPHONE ENCOUNTER

## 2025-04-30 DIAGNOSIS — D72.829 LEUKOCYTOSIS, UNSPECIFIED TYPE: Primary | ICD-10-CM

## 2025-04-30 NOTE — PROGRESS NOTES
screening - Colonoscopy on 12/9/2024 per Dr. Brian Franklin revealed Internal hemorrhoids-Grade 1. Recall 5 year.         Orders this encounter  CBC with Diff    Return in about 6 months (around 11/2/2025) for With Louis.  CBC and reevaluation    Louis Louis PA-C  9:32 AM  5/2/2025

## 2025-05-02 ENCOUNTER — HOSPITAL ENCOUNTER (OUTPATIENT)
Dept: INFUSION THERAPY | Age: 45
Discharge: HOME | End: 2025-05-02
Payer: COMMERCIAL

## 2025-05-02 ENCOUNTER — OFFICE VISIT (OUTPATIENT)
Dept: HEMATOLOGY | Age: 45
End: 2025-05-02
Payer: COMMERCIAL

## 2025-05-02 VITALS
DIASTOLIC BLOOD PRESSURE: 80 MMHG | HEART RATE: 79 BPM | SYSTOLIC BLOOD PRESSURE: 130 MMHG | OXYGEN SATURATION: 97 % | BODY MASS INDEX: 40.52 KG/M2 | WEIGHT: 283 LBS | TEMPERATURE: 97.9 F | HEIGHT: 70 IN

## 2025-05-02 DIAGNOSIS — N20.0 NEPHROLITHIASIS: ICD-10-CM

## 2025-05-02 DIAGNOSIS — D72.829 LEUKOCYTOSIS, UNSPECIFIED TYPE: ICD-10-CM

## 2025-05-02 DIAGNOSIS — K86.2 PANCREATIC CYST: ICD-10-CM

## 2025-05-02 DIAGNOSIS — E61.1 IRON DEFICIENCY: Primary | ICD-10-CM

## 2025-05-02 LAB
BASOPHILS # BLD: 0.13 K/UL (ref 0–0.2)
BASOPHILS NFR BLD: 1.3 % (ref 0–1)
EOSINOPHIL # BLD: 0.22 K/UL (ref 0–0.6)
EOSINOPHIL NFR BLD: 2.2 % (ref 0–5)
ERYTHROCYTE [DISTWIDTH] IN BLOOD BY AUTOMATED COUNT: 13.2 % (ref 11.5–14.5)
HCT VFR BLD AUTO: 43.4 % (ref 42–52)
HGB BLD-MCNC: 14.8 G/DL (ref 14–18)
LYMPHOCYTES # BLD: 1.74 K/UL (ref 1.1–4.5)
LYMPHOCYTES NFR BLD: 17.4 % (ref 20–40)
MCH RBC QN AUTO: 27.7 PG (ref 27–31)
MCHC RBC AUTO-ENTMCNC: 34.1 G/DL (ref 33–37)
MCV RBC AUTO: 81.3 FL (ref 80–94)
MONOCYTES # BLD: 0.63 K/UL (ref 0–0.9)
MONOCYTES NFR BLD: 6.3 % (ref 1–10)
NEUTROPHILS # BLD: 7.23 K/UL (ref 1.5–7.5)
NEUTS SEG NFR BLD: 72.5 % (ref 50–65)
PLATELET # BLD AUTO: 303 K/UL (ref 130–400)
PMV BLD AUTO: 9.8 FL (ref 9.4–12.4)
RBC # BLD AUTO: 5.34 M/UL (ref 4.7–6.1)
WBC # BLD AUTO: 9.98 K/UL (ref 4.8–10.8)

## 2025-05-02 PROCEDURE — 99211 OFF/OP EST MAY X REQ PHY/QHP: CPT

## 2025-05-02 PROCEDURE — 85025 COMPLETE CBC W/AUTO DIFF WBC: CPT

## 2025-05-02 PROCEDURE — 99213 OFFICE O/P EST LOW 20 MIN: CPT | Performed by: PHYSICIAN ASSISTANT

## 2025-05-02 PROCEDURE — 36415 COLL VENOUS BLD VENIPUNCTURE: CPT

## 2025-05-02 RX ORDER — CETIRIZINE HYDROCHLORIDE 10 MG/1
10 TABLET ORAL PRN
COMMUNITY

## 2025-05-02 ASSESSMENT — ENCOUNTER SYMPTOMS
COUGH: 0
VOMITING: 0
BLOOD IN STOOL: 0
ABDOMINAL PAIN: 0
WHEEZING: 0
BACK PAIN: 1
EYE DISCHARGE: 0
EYE ITCHING: 0
PHOTOPHOBIA: 0
VOICE CHANGE: 0
COLOR CHANGE: 0
ABDOMINAL DISTENTION: 0
SHORTNESS OF BREATH: 0
TROUBLE SWALLOWING: 0
NAUSEA: 0
SORE THROAT: 0
CONSTIPATION: 0
DIARRHEA: 0

## 2025-05-27 ENCOUNTER — LAB (OUTPATIENT)
Dept: LAB | Facility: HOSPITAL | Age: 45
End: 2025-05-27
Payer: COMMERCIAL

## 2025-05-27 DIAGNOSIS — N20.0 KIDNEY STONE: ICD-10-CM

## 2025-05-27 PROCEDURE — 82340 ASSAY OF CALCIUM IN URINE: CPT

## 2025-05-27 PROCEDURE — 82570 ASSAY OF URINE CREATININE: CPT

## 2025-05-27 PROCEDURE — 84560 ASSAY OF URINE/URIC ACID: CPT

## 2025-05-27 PROCEDURE — 83945 ASSAY OF OXALATE: CPT

## 2025-05-27 PROCEDURE — 82507 ASSAY OF CITRATE: CPT

## 2025-05-27 PROCEDURE — 82615 TEST FOR URINE CYSTINES: CPT

## 2025-05-27 PROCEDURE — 84540 ASSAY OF URINE/UREA-N: CPT

## 2025-05-27 PROCEDURE — 82436 ASSAY OF URINE CHLORIDE: CPT

## 2025-05-27 PROCEDURE — 84392 ASSAY OF URINE SULFATE: CPT

## 2025-05-27 PROCEDURE — 84133 ASSAY OF URINE POTASSIUM: CPT

## 2025-05-27 PROCEDURE — 83986 ASSAY PH BODY FLUID NOS: CPT

## 2025-05-27 PROCEDURE — 84105 ASSAY OF URINE PHOSPHORUS: CPT

## 2025-05-27 PROCEDURE — 84300 ASSAY OF URINE SODIUM: CPT

## 2025-05-27 PROCEDURE — 82140 ASSAY OF AMMONIA: CPT

## 2025-05-27 PROCEDURE — 83735 ASSAY OF MAGNESIUM: CPT

## 2025-06-03 NOTE — PROGRESS NOTES
Subjective    Mr. Spencer is 45 y.o. male    Chief Complaint: Kidney Stones    History of Present Illness  Patient presents for 6-month follow-up with history of recurrent kidney stones.  Patient had previous 24-hour urine he had a recent 24-hour urine he is here to discuss findings.  He is on allopurinol for elevated uric acid in the urine.  He has passed a few small stones but no acute episodes since he was last seen.  His urinalysis shows a urine pH of 7.0 KUB shows stable stones.  See below for results of [facility] and discussion.    The following portions of the patient's history were reviewed and updated as appropriate: allergies, current medications, past family history, past medical history, past social history, past surgical history and problem list.    Review of Systems   Genitourinary: Negative.          Current Outpatient Medications:     allopurinol (Zyloprim) 100 MG tablet, Take 1 tablet by mouth Daily., Disp: 30 tablet, Rfl: 11    aluminum hydroxide-mag carbonate (GAVISCON EXTRA RELIEF) 160-105 MG chewable tablet chewable tablet, Chew 2 tablets At Night As Needed., Disp: , Rfl:     cholecalciferol (VITAMIN D3) 25 MCG (1000 UT) tablet, Take 1 tablet by mouth Daily., Disp: , Rfl:     econazole nitrate (SPECTAZOLE) 1 % cream, 1 Application As Needed., Disp: , Rfl:     ferrous sulfate 325 (65 FE) MG tablet, Take 1 tablet by mouth Daily With Breakfast., Disp: , Rfl:     fluticasone (FLONASE) 50 MCG/ACT nasal spray, 2 sprays into the nostril(s) as directed by provider 2 (Two) Times a Day. (Patient taking differently: Administer 2 sprays into the nostril(s) as directed by provider As Needed.), Disp: 48 g, Rfl: 3    hydroCHLOROthiazide (HYDRODIURIL) 12.5 MG tablet, Take 1 tablet by mouth Every Morning., Disp: , Rfl:     Hydrocortisone, Perianal, (ANUSOL-HC) 2.5 % rectal cream, May use twice daily after bowel movement and at bedtime as needed, Disp: , Rfl:     ibuprofen (ADVIL,MOTRIN) 200 MG tablet, Take 1  tablet by mouth Every 6 (Six) Hours As Needed for Mild Pain., Disp: , Rfl:     ketorolac (TORADOL) 10 MG tablet, Take 1 tablet by mouth Every 6 (Six) Hours As Needed for Moderate Pain., Disp: 15 tablet, Rfl: 0    lisinopril (PRINIVIL,ZESTRIL) 40 MG tablet, Take 1 tablet by mouth Daily., Disp: , Rfl:     ondansetron (Zofran) 4 MG tablet, Take 1 tablet by mouth Every 8 (Eight) Hours As Needed for Nausea or Vomiting., Disp: 12 tablet, Rfl: 0    ondansetron (ZOFRAN) 8 MG tablet, Take 1 tablet by mouth As Needed for Nausea., Disp: , Rfl:     ondansetron ODT (ZOFRAN-ODT) 4 MG disintegrating tablet, Place 1 tablet on the tongue Every 4 (Four) Hours As Needed for Nausea or Vomiting., Disp: 10 tablet, Rfl: 0    pantoprazole (PROTONIX) 40 MG EC tablet, Take 1 tablet by mouth., Disp: , Rfl:     tamsulosin (FLOMAX) 0.4 MG capsule 24 hr capsule, Take 1 capsule by mouth Daily., Disp: 30 capsule, Rfl: 0    HYDROcodone-acetaminophen (NORCO)  MG per tablet, Take 1 tablet by mouth Every 8 (Eight) Hours As Needed. (Patient not taking: Reported on 6/20/2025), Disp: , Rfl:     HYDROcodone-acetaminophen (NORCO) 7.5-325 MG per tablet, Take 1 tablet by mouth Every 6 (Six) Hours As Needed for Moderate Pain (Pain). (Patient not taking: Reported on 6/20/2025), Disp: 12 tablet, Rfl: 0    potassium citrate (UROCIT-K) 10 MEQ (1080 MG) CR tablet, Take 1 tablet by mouth 3 (Three) Times a Day With Meals for 90 days., Disp: 90 tablet, Rfl: 2    Current Facility-Administered Medications:     lidocaine (XYLOCAINE) 1 % injection 5 mL, 5 mL, Infiltration, Once, En Cano MD    lidocaine (XYLOCAINE) 1 % injection 5 mL, 5 mL, Infiltration, Once, En Cano MD    Past Medical History:   Diagnosis Date    Bell's palsy     Disease of thyroid gland 2017    GERD (gastroesophageal reflux disease)     Hypertension     Kidney stone     Pancreatic cyst     PONV (postoperative nausea and vomiting)     Sleep apnea     TMJ dysfunction 2015  "      Past Surgical History:   Procedure Laterality Date    EXTRACORPOREAL SHOCK WAVE LITHOTRIPSY (ESWL) Left 04/14/2023    Procedure: EXTRACORPOREAL SHOCKWAVE LITHOTRIPSY-left;  Surgeon: Mt Avila MD;  Location:  [facility];  Service: Urology;  Laterality: Left;    EXTRACORPOREAL SHOCK WAVE LITHOTRIPSY (ESWL) Right 05/15/2024    Procedure: EXTRACORPOREAL SHOCKWAVE LITHOTRIPSY-right;  Surgeon: Mt Avila MD;  Location:  [facility];  Service: Urology;  Laterality: Right;    EXTRACORPOREAL SHOCK WAVE LITHOTRIPSY (ESWL) Left 10/2/2024    Procedure: EXTRACORPOREAL SHOCKWAVE LITHOTRIPSY-left;  Surgeon: Mt Avila MD;  Location:  [facility];  Service: Urology;  Laterality: Left;    US GUIDED LYMPH NODE BIOPSY  10/1/2024       Social History     Socioeconomic History    Marital status:    Tobacco Use    Smoking status: Never    Smokeless tobacco: Never   Vaping Use    Vaping status: Never Used   Substance and Sexual Activity    Alcohol use: Never    Drug use: Never    Sexual activity: Defer       Family History   Problem Relation Age of Onset    Diabetes type II Father     Heart disease Father     Diabetes Father     Hypertension Father     Hyperlipidemia Father     Diabetes type II Maternal Grandmother     Diabetes Maternal Grandmother        Objective    Temp 96.7 °F (35.9 °C)   Ht 177.8 cm (70\")   Wt 126 kg (278 lb)   BMI 39.89 kg/m²     Physical Exam        Results for orders placed or performed in visit on 06/20/25   POC Urinalysis Dipstick, Multipro    Collection Time: 06/20/25  8:32 AM    Specimen: Urine   Result Value Ref Range    Color Yellow Yellow, Straw, Dark Yellow, Vesna    Clarity, UA Clear Clear    Glucose, UA Negative Negative mg/dL    Bilirubin Negative Negative    Ketones, UA Negative Negative    Specific Gravity  1.015 1.005 - 1.030    Blood, UA Negative Negative    pH, Urine 7.0 5.0 - 8.0    Protein, POC Negative Negative mg/dL    Urobilinogen, UA 0.2 E.U./dL Normal, 0.2 " E.U./dL    Nitrite, UA Negative Negative    Leukocytes Negative Negative     KUB independent review    A KUB is available for me to review today.  The image is inspected for a bowel gas pattern and the general bone structure of the spine and pelvis. The kidneys are then inspected closely.  Renal outline is noted if identifiable. The kidney, collecting system, and anticipated path of the ureter are examined for calcifications including those in the true pelvis.  This film reveals:    On the right there are no calcificaitons seen in the kidney or the expected course of the ureter. .    On the left there are multiple renal stones. Up to 7 mm.      Assessment and Plan    Diagnoses and all orders for this visit:    1. Kidney stones (Primary)  -     POC Urinalysis Dipstick, Multipro  -     Basic Metabolic Panel; Future  -     [facility] 24-Hour Urine, Kidney Stone -; Future  -     potassium citrate (UROCIT-K) 10 MEQ (1080 MG) CR tablet; Take 1 tablet by mouth 3 (Three) Times a Day With Meals for 90 days.  Dispense: 90 tablet; Refill: 2    To 4 stones the largest being 7 mm.  He has passed a few small stones also    24-hour urine showed uric acid level in the urine most likely improved after starting allopurinol.  Urine volume remains borderline low at 1.79 L I explained a good goal is 2.5 L daily.  Urine calcium is high and his rhythm was 155 he is down to 83.  He is on 0.5 mg hydrochlorothiazide.  I think reduction in dietary sodium to a max of 3 2300 mg daily could reduce urine calcium.  Also patient has been taking or using Tums which is a high calcium content I think if he stops the Tums which she has already could decrease sodium his urine calcium may decrease without increasing his hydrochlorothiazide.    His urine citrate is low at 197 he states that he drinks lemonade often.  I think given the presence of stones and this low citrate which is a risk factor for stone formation I feel he needs to start potassium  citrate.  He is in agreement with this plan    I would like him to return in 3 months with a repeat 24-hour urine and basic metabolic panel.

## 2025-06-05 LAB
AMMONIA 24H UR-SRATE: 39 MMOL/24 HR (ref 15–60)
CA H2 PHOS DIHYD 24H SATFR UR: 1.34 (ref 0.5–2)
CALCIUM 24H UR-MRATE: 283 MG/24 HR
CALCIUM/CREAT 24H UR: 132 MG/G CREAT (ref 34–196)
CALCIUM/KG BODY WEIGHT: 2.2 MG/24 HR/KG
CAOX INDEX 24H UR-RTO: 5.1 (ref 6–10)
CHLORIDE 24H UR-SRATE: 262 MMOL/24 HR (ref 70–250)
CITRATE 24H UR-MRATE: 197 MG/24 HR
CREAT 24H UR-MRATE: 2147 MG/24 HR
CREAT/BW 24H UR-RELMRAT: 16.6 MG/24 HR/KG (ref 11.9–24.4)
CYSTINE 24H UR QL: ABNORMAL
LABORATORY COMMENT REPORT: ABNORMAL
MAGNESIUM 24H UR-MRATE: 97 MG/24 HR (ref 30–120)
OXALATE 24H UR-MRATE: 25 MG/24 HR (ref 20–40)
PH 24H UR: 6.06 [PH] (ref 5.8–6.2)
PHOSPHATE 24H UR-MRATE: 973 MG/24 HR (ref 600–1200)
POTASSIUM 24H UR-SRATE: 56 MMOL/24 HR (ref 20–100)
PROTEIN CATABOLIC RATE 24H UR-MRATE: 0.8 G/KG/24 HR (ref 0.8–1.4)
SODIUM 24H UR-SRATE: 268 MMOL/24 HR (ref 50–150)
SPECIMEN VOL 24H UR: 1790 ML/24 HR (ref 500–4000)
SULFATE 24H UR-SRATE: 57 MEQ/24 HR (ref 20–80)
URATE 24H SATFR UR: 0.76
URATE 24H UR-MRATE: 771 MG/24 HR
UUN 24H UR-MRATE: 13.37 G/24 HR (ref 6–14)

## 2025-06-20 ENCOUNTER — HOSPITAL ENCOUNTER (OUTPATIENT)
Dept: GENERAL RADIOLOGY | Facility: HOSPITAL | Age: 45
Discharge: HOME | End: 2025-06-20
Payer: COMMERCIAL

## 2025-06-20 ENCOUNTER — OFFICE VISIT (OUTPATIENT)
Dept: UROLOGY | Facility: CLINIC | Age: 45
End: 2025-06-20
Payer: COMMERCIAL

## 2025-06-20 VITALS — TEMPERATURE: 96.7 F | HEIGHT: 70 IN | BODY MASS INDEX: 39.8 KG/M2 | WEIGHT: 278 LBS

## 2025-06-20 DIAGNOSIS — N20.0 KIDNEY STONE: ICD-10-CM

## 2025-06-20 DIAGNOSIS — N20.0 KIDNEY STONES: Primary | ICD-10-CM

## 2025-06-20 PROCEDURE — 74018 RADEX ABDOMEN 1 VIEW: CPT

## 2025-06-20 RX ORDER — HYDROCORTISONE 25 MG/G
CREAM TOPICAL
COMMUNITY
Start: 2025-02-07

## 2025-06-20 RX ORDER — POTASSIUM CITRATE 1080 MG/1
10 TABLET, EXTENDED RELEASE ORAL
Qty: 90 TABLET | Refills: 2 | Status: SHIPPED | OUTPATIENT
Start: 2025-06-20 | End: 2025-09-18

## 2025-08-08 ENCOUNTER — OFFICE VISIT (OUTPATIENT)
Dept: GASTROENTEROLOGY | Age: 45
End: 2025-08-08
Payer: COMMERCIAL

## 2025-08-08 VITALS
HEIGHT: 70 IN | HEART RATE: 73 BPM | SYSTOLIC BLOOD PRESSURE: 130 MMHG | WEIGHT: 282 LBS | DIASTOLIC BLOOD PRESSURE: 80 MMHG | BODY MASS INDEX: 40.37 KG/M2 | OXYGEN SATURATION: 98 %

## 2025-08-08 DIAGNOSIS — K21.9 GASTROESOPHAGEAL REFLUX DISEASE WITHOUT ESOPHAGITIS: ICD-10-CM

## 2025-08-08 DIAGNOSIS — K76.0 HEPATIC STEATOSIS: Primary | ICD-10-CM

## 2025-08-08 DIAGNOSIS — K64.0 GRADE I INTERNAL HEMORRHOIDS: ICD-10-CM

## 2025-08-08 PROCEDURE — 99214 OFFICE O/P EST MOD 30 MIN: CPT

## 2025-08-08 RX ORDER — POTASSIUM CITRATE 1080 MG/1
10 TABLET, EXTENDED RELEASE ORAL
COMMUNITY
Start: 2025-06-20 | End: 2025-09-19

## 2025-08-08 ASSESSMENT — ENCOUNTER SYMPTOMS
CHOKING: 0
EYES NEGATIVE: 1
TROUBLE SWALLOWING: 0
RECTAL PAIN: 1
ABDOMINAL PAIN: 0
NAUSEA: 0
VOMITING: 0
ALLERGIC/IMMUNOLOGIC NEGATIVE: 1
CONSTIPATION: 0
DIARRHEA: 0
RESPIRATORY NEGATIVE: 1

## (undated) DEVICE — GOWN,NON-REINFORCED,SIRUS,SET IN SLV,XXL: Brand: MEDLINE

## (undated) DEVICE — FORCEPS BX L240CM JAW DIA2.8MM L CAP W/ NDL MIC MESH TOOTH

## (undated) DEVICE — ENDO KIT,LOURDES HOSPITAL: Brand: MEDLINE INDUSTRIES, INC.